# Patient Record
Sex: FEMALE | Race: WHITE | NOT HISPANIC OR LATINO
[De-identification: names, ages, dates, MRNs, and addresses within clinical notes are randomized per-mention and may not be internally consistent; named-entity substitution may affect disease eponyms.]

---

## 2017-02-16 ENCOUNTER — APPOINTMENT (OUTPATIENT)
Dept: ORTHOPEDIC SURGERY | Facility: CLINIC | Age: 62
End: 2017-02-16

## 2017-02-16 VITALS — BODY MASS INDEX: 33.29 KG/M2 | WEIGHT: 195 LBS | HEIGHT: 64 IN

## 2017-02-16 VITALS — HEART RATE: 83 BPM | SYSTOLIC BLOOD PRESSURE: 168 MMHG | DIASTOLIC BLOOD PRESSURE: 92 MMHG

## 2017-03-08 ENCOUNTER — TRANSCRIPTION ENCOUNTER (OUTPATIENT)
Age: 62
End: 2017-03-08

## 2017-03-27 ENCOUNTER — APPOINTMENT (OUTPATIENT)
Dept: INTERNAL MEDICINE | Facility: CLINIC | Age: 62
End: 2017-03-27

## 2017-03-27 VITALS
SYSTOLIC BLOOD PRESSURE: 130 MMHG | BODY MASS INDEX: 33.46 KG/M2 | HEIGHT: 64 IN | OXYGEN SATURATION: 98 % | TEMPERATURE: 97.8 F | DIASTOLIC BLOOD PRESSURE: 84 MMHG | HEART RATE: 88 BPM | WEIGHT: 196 LBS

## 2017-03-27 VITALS — SYSTOLIC BLOOD PRESSURE: 150 MMHG | DIASTOLIC BLOOD PRESSURE: 100 MMHG

## 2017-03-27 VITALS — SYSTOLIC BLOOD PRESSURE: 150 MMHG | DIASTOLIC BLOOD PRESSURE: 94 MMHG

## 2017-03-27 DIAGNOSIS — Z87.891 PERSONAL HISTORY OF NICOTINE DEPENDENCE: ICD-10-CM

## 2017-03-27 DIAGNOSIS — Z78.9 OTHER SPECIFIED HEALTH STATUS: ICD-10-CM

## 2017-03-27 RX ORDER — TOLTERODINE TARTRATE 4 MG/1
CAPSULE, EXTENDED RELEASE ORAL
Refills: 0 | Status: DISCONTINUED | COMMUNITY
End: 2017-03-27

## 2017-03-27 RX ORDER — MELOXICAM 15 MG/1
15 TABLET ORAL
Qty: 30 | Refills: 0 | Status: DISCONTINUED | COMMUNITY
Start: 2017-02-16 | End: 2017-03-27

## 2017-03-27 RX ORDER — OMEPRAZOLE MAGNESIUM 40 MG/1
CAPSULE, DELAYED RELEASE ORAL
Refills: 0 | Status: DISCONTINUED | COMMUNITY
End: 2017-03-27

## 2017-04-03 ENCOUNTER — RX RENEWAL (OUTPATIENT)
Age: 62
End: 2017-04-03

## 2017-04-17 ENCOUNTER — APPOINTMENT (OUTPATIENT)
Dept: ORTHOPEDIC SURGERY | Facility: CLINIC | Age: 62
End: 2017-04-17

## 2017-04-19 ENCOUNTER — FORM ENCOUNTER (OUTPATIENT)
Age: 62
End: 2017-04-19

## 2017-04-20 ENCOUNTER — APPOINTMENT (OUTPATIENT)
Dept: MRI IMAGING | Facility: CLINIC | Age: 62
End: 2017-04-20

## 2017-04-20 ENCOUNTER — OUTPATIENT (OUTPATIENT)
Dept: OUTPATIENT SERVICES | Facility: HOSPITAL | Age: 62
LOS: 1 days | End: 2017-04-20
Payer: COMMERCIAL

## 2017-04-20 DIAGNOSIS — M75.42 IMPINGEMENT SYNDROME OF LEFT SHOULDER: ICD-10-CM

## 2017-04-20 DIAGNOSIS — N85.8 OTHER SPECIFIED NONINFLAMMATORY DISORDERS OF UTERUS: Chronic | ICD-10-CM

## 2017-04-20 PROCEDURE — 73221 MRI JOINT UPR EXTREM W/O DYE: CPT

## 2017-04-27 ENCOUNTER — FORM ENCOUNTER (OUTPATIENT)
Age: 62
End: 2017-04-27

## 2017-04-28 ENCOUNTER — APPOINTMENT (OUTPATIENT)
Dept: INTERNAL MEDICINE | Facility: CLINIC | Age: 62
End: 2017-04-28

## 2017-04-28 ENCOUNTER — APPOINTMENT (OUTPATIENT)
Dept: ULTRASOUND IMAGING | Facility: IMAGING CENTER | Age: 62
End: 2017-04-28

## 2017-04-28 ENCOUNTER — OUTPATIENT (OUTPATIENT)
Dept: OUTPATIENT SERVICES | Facility: HOSPITAL | Age: 62
LOS: 1 days | End: 2017-04-28
Payer: COMMERCIAL

## 2017-04-28 VITALS
SYSTOLIC BLOOD PRESSURE: 130 MMHG | BODY MASS INDEX: 33.12 KG/M2 | DIASTOLIC BLOOD PRESSURE: 80 MMHG | WEIGHT: 194 LBS | TEMPERATURE: 98.3 F | OXYGEN SATURATION: 99 % | HEIGHT: 64 IN | HEART RATE: 81 BPM

## 2017-04-28 DIAGNOSIS — N85.8 OTHER SPECIFIED NONINFLAMMATORY DISORDERS OF UTERUS: Chronic | ICD-10-CM

## 2017-04-28 DIAGNOSIS — Z00.8 ENCOUNTER FOR OTHER GENERAL EXAMINATION: ICD-10-CM

## 2017-04-28 LAB
BILIRUB UR QL STRIP: NORMAL
GLUCOSE UR-MCNC: NORMAL
HCG UR QL: 0.2 EU/DL
HGB UR QL STRIP.AUTO: NORMAL
KETONES UR-MCNC: NORMAL
LEUKOCYTE ESTERASE UR QL STRIP: NORMAL
NITRITE UR QL STRIP: NORMAL
PH UR STRIP: 5.5
PROT UR STRIP-MCNC: NORMAL
SP GR UR STRIP: 1.01

## 2017-04-28 PROCEDURE — 76700 US EXAM ABDOM COMPLETE: CPT

## 2017-04-29 LAB
ALBUMIN SERPL ELPH-MCNC: 4.1 G/DL
ALP BLD-CCNC: 102 U/L
ALT SERPL-CCNC: 18 U/L
AMYLASE/CREAT SERPL: 34 U/L
ANION GAP SERPL CALC-SCNC: 15 MMOL/L
AST SERPL-CCNC: 23 U/L
BASOPHILS # BLD AUTO: 0.01 K/UL
BASOPHILS NFR BLD AUTO: 0.2 %
BILIRUB SERPL-MCNC: 0.3 MG/DL
BUN SERPL-MCNC: 16 MG/DL
CALCIUM SERPL-MCNC: 9.7 MG/DL
CHLORIDE SERPL-SCNC: 101 MMOL/L
CO2 SERPL-SCNC: 23 MMOL/L
CREAT SERPL-MCNC: 0.66 MG/DL
EOSINOPHIL # BLD AUTO: 0.09 K/UL
EOSINOPHIL NFR BLD AUTO: 1.6 %
GLUCOSE SERPL-MCNC: 80 MG/DL
HCT VFR BLD CALC: 36.7 %
HGB BLD-MCNC: 11.7 G/DL
IMM GRANULOCYTES NFR BLD AUTO: 0.2 %
LPL SERPL-CCNC: 16 U/L
LYMPHOCYTES # BLD AUTO: 1.58 K/UL
LYMPHOCYTES NFR BLD AUTO: 27.5 %
MAN DIFF?: NORMAL
MCHC RBC-ENTMCNC: 29.5 PG
MCHC RBC-ENTMCNC: 31.9 GM/DL
MCV RBC AUTO: 92.7 FL
MONOCYTES # BLD AUTO: 0.41 K/UL
MONOCYTES NFR BLD AUTO: 7.1 %
NEUTROPHILS # BLD AUTO: 3.65 K/UL
NEUTROPHILS NFR BLD AUTO: 63.4 %
PLATELET # BLD AUTO: 282 K/UL
POTASSIUM SERPL-SCNC: 4.4 MMOL/L
PROT SERPL-MCNC: 7.9 G/DL
RBC # BLD: 3.96 M/UL
RBC # FLD: 15.4 %
SODIUM SERPL-SCNC: 139 MMOL/L
WBC # FLD AUTO: 5.75 K/UL

## 2017-04-30 LAB — BACTERIA UR CULT: NORMAL

## 2017-05-01 ENCOUNTER — CLINICAL ADVICE (OUTPATIENT)
Age: 62
End: 2017-05-01

## 2017-05-08 ENCOUNTER — APPOINTMENT (OUTPATIENT)
Dept: ORTHOPEDIC SURGERY | Facility: CLINIC | Age: 62
End: 2017-05-08

## 2017-06-01 ENCOUNTER — APPOINTMENT (OUTPATIENT)
Dept: ORTHOPEDIC SURGERY | Facility: CLINIC | Age: 62
End: 2017-06-01
Payer: OTHER MISCELLANEOUS

## 2017-06-01 ENCOUNTER — EMERGENCY (EMERGENCY)
Facility: HOSPITAL | Age: 62
LOS: 1 days | Discharge: HOME | End: 2017-06-01
Attending: EMERGENCY MEDICINE | Admitting: EMERGENCY MEDICINE
Payer: COMMERCIAL

## 2017-06-01 VITALS
OXYGEN SATURATION: 98 % | HEART RATE: 76 BPM | WEIGHT: 192.9 LBS | SYSTOLIC BLOOD PRESSURE: 127 MMHG | RESPIRATION RATE: 16 BRPM | TEMPERATURE: 98 F | DIASTOLIC BLOOD PRESSURE: 59 MMHG | HEIGHT: 64 IN

## 2017-06-01 VITALS
OXYGEN SATURATION: 100 % | DIASTOLIC BLOOD PRESSURE: 89 MMHG | RESPIRATION RATE: 16 BRPM | SYSTOLIC BLOOD PRESSURE: 151 MMHG | HEART RATE: 75 BPM | TEMPERATURE: 98 F

## 2017-06-01 VITALS
DIASTOLIC BLOOD PRESSURE: 84 MMHG | WEIGHT: 194 LBS | HEART RATE: 84 BPM | HEIGHT: 64 IN | BODY MASS INDEX: 33.12 KG/M2 | SYSTOLIC BLOOD PRESSURE: 133 MMHG

## 2017-06-01 DIAGNOSIS — N85.8 OTHER SPECIFIED NONINFLAMMATORY DISORDERS OF UTERUS: Chronic | ICD-10-CM

## 2017-06-01 PROCEDURE — 99283 EMERGENCY DEPT VISIT LOW MDM: CPT | Mod: 25

## 2017-06-01 PROCEDURE — 29125 APPL SHORT ARM SPLINT STATIC: CPT

## 2017-06-01 PROCEDURE — 73110 X-RAY EXAM OF WRIST: CPT

## 2017-06-01 PROCEDURE — 73110 X-RAY EXAM OF WRIST: CPT | Mod: 26,RT

## 2017-06-01 PROCEDURE — 29125 APPL SHORT ARM SPLINT STATIC: CPT | Mod: LT

## 2017-06-01 PROCEDURE — 99203 OFFICE O/P NEW LOW 30 MIN: CPT

## 2017-06-01 RX ORDER — TRAMADOL HYDROCHLORIDE 50 MG/1
1 TABLET ORAL
Qty: 15 | Refills: 0 | OUTPATIENT
Start: 2017-06-01

## 2017-06-01 RX ORDER — TRAMADOL HYDROCHLORIDE 50 MG/1
50 TABLET ORAL ONCE
Qty: 0 | Refills: 0 | Status: DISCONTINUED | OUTPATIENT
Start: 2017-06-01 | End: 2017-06-01

## 2017-06-01 RX ADMIN — TRAMADOL HYDROCHLORIDE 50 MILLIGRAM(S): 50 TABLET ORAL at 10:52

## 2017-06-01 RX ADMIN — TRAMADOL HYDROCHLORIDE 50 MILLIGRAM(S): 50 TABLET ORAL at 10:49

## 2017-06-01 NOTE — ED PROVIDER NOTE - MEDICAL DECISION MAKING DETAILS
60 yo female fell on [address] co left wrist fx. Xray reveals Colles. Plan- Splint sling, Rx tramadol, FU orthopedist.

## 2017-06-01 NOTE — ED ADULT NURSE NOTE - OBJECTIVE STATEMENT
61 yr. old female s/p fall.  C/o right wrist pain.  Positive deformity, positive pulses.  Positioned for comfort.  Ice applied.

## 2017-06-01 NOTE — ED PROCEDURE NOTE - NS ED PERI VASCULAR NEG
fingers/toes warm to touch/capillary refill time < 2 seconds/no paresthesia/no swelling/no cyanosis of extremity

## 2017-06-01 NOTE — ED PROCEDURE NOTE - CPROC ED POST PROC CARE GUIDE1
Elevate the injured extremity as instructed./Verbal/written post procedure instructions were given to patient/caregiver./Keep the cast/splint/dressing clean and dry./Instructed patient/caregiver to follow-up with primary care physician.

## 2017-06-06 ENCOUNTER — OUTPATIENT (OUTPATIENT)
Dept: OUTPATIENT SERVICES | Facility: HOSPITAL | Age: 62
LOS: 1 days | End: 2017-06-06
Payer: OTHER MISCELLANEOUS

## 2017-06-06 ENCOUNTER — APPOINTMENT (OUTPATIENT)
Dept: ORTHOPEDIC SURGERY | Facility: CLINIC | Age: 62
End: 2017-06-06

## 2017-06-06 VITALS
WEIGHT: 194.01 LBS | SYSTOLIC BLOOD PRESSURE: 130 MMHG | TEMPERATURE: 98 F | DIASTOLIC BLOOD PRESSURE: 90 MMHG | HEART RATE: 67 BPM | RESPIRATION RATE: 16 BRPM | HEIGHT: 64.5 IN

## 2017-06-06 DIAGNOSIS — N85.8 OTHER SPECIFIED NONINFLAMMATORY DISORDERS OF UTERUS: Chronic | ICD-10-CM

## 2017-06-06 DIAGNOSIS — Z90.49 ACQUIRED ABSENCE OF OTHER SPECIFIED PARTS OF DIGESTIVE TRACT: Chronic | ICD-10-CM

## 2017-06-06 DIAGNOSIS — I10 ESSENTIAL (PRIMARY) HYPERTENSION: ICD-10-CM

## 2017-06-06 DIAGNOSIS — S52.531A COLLES' FRACTURE OF RIGHT RADIUS, INITIAL ENCOUNTER FOR CLOSED FRACTURE: ICD-10-CM

## 2017-06-06 PROCEDURE — 93010 ELECTROCARDIOGRAM REPORT: CPT

## 2017-06-06 NOTE — H&P PST ADULT - PRO PAIN LIFE ADAPT
inability to work/inability or reluctance to perform ADLs/decreased appetite/inability to sleep/decreased activity level

## 2017-06-06 NOTE — H&P PST ADULT - PROBLEM SELECTOR PLAN 1
61 yr old female scheduled for open reduction internal fixation right distal radius on 6/7/2017  Pre op instruction given and patient verbalized understanding  Labs in chart  EKG done  Case discussed with Dr. Anton 61 yr old female scheduled for open reduction internal fixation right distal radius on 6/7/2017  Pre op instruction given and patient verbalized understanding  Labs in chart  EKG done

## 2017-06-06 NOTE — H&P PST ADULT - PMH
Gallstones    GERD (gastroesophageal reflux disease)    Hypertension    Irritable bladder    Obesity (BMI 30-39.9)

## 2017-06-06 NOTE — H&P PST ADULT - RS GEN PE MLT RESP DETAILS PC
no wheezes/airway patent/clear to auscultation bilaterally/breath sounds equal/no rhonchi/respirations non-labored/no rales

## 2017-06-06 NOTE — H&P PST ADULT - HISTORY OF PRESENT ILLNESS
61 yr old female with PMH HTN, GERD Overactive bladder presents to PST with h/o fall on 6/1/17,c/o right hand pain xray findings of Colles' fracture , scheduled for open reduction and internal fixation right distal radius on 6/7/2017

## 2017-06-06 NOTE — H&P PST ADULT - NEGATIVE NEUROLOGICAL SYMPTOMS
no weakness/no focal seizures/no generalized seizures/no syncope/no transient paralysis/no paresthesias

## 2017-06-06 NOTE — H&P PST ADULT - FAMILY HISTORY
Father  Still living? No  Family history of COPD (chronic obstructive pulmonary disease), Age at diagnosis: Age Unknown

## 2017-06-07 ENCOUNTER — APPOINTMENT (OUTPATIENT)
Dept: ORTHOPEDIC SURGERY | Facility: AMBULATORY SURGERY CENTER | Age: 62
End: 2017-06-07

## 2017-06-07 ENCOUNTER — OUTPATIENT (OUTPATIENT)
Dept: OUTPATIENT SERVICES | Facility: HOSPITAL | Age: 62
LOS: 1 days | Discharge: HOME | End: 2017-06-07
Payer: OTHER MISCELLANEOUS

## 2017-06-07 VITALS
HEART RATE: 72 BPM | TEMPERATURE: 98 F | OXYGEN SATURATION: 100 % | RESPIRATION RATE: 16 BRPM | DIASTOLIC BLOOD PRESSURE: 66 MMHG | HEIGHT: 64.5 IN | SYSTOLIC BLOOD PRESSURE: 147 MMHG | WEIGHT: 194.01 LBS

## 2017-06-07 VITALS
HEART RATE: 83 BPM | SYSTOLIC BLOOD PRESSURE: 150 MMHG | OXYGEN SATURATION: 96 % | TEMPERATURE: 98 F | DIASTOLIC BLOOD PRESSURE: 84 MMHG | RESPIRATION RATE: 16 BRPM

## 2017-06-07 DIAGNOSIS — Z60.2 PROBLEMS RELATED TO LIVING ALONE: ICD-10-CM

## 2017-06-07 DIAGNOSIS — S52.531A COLLES' FRACTURE OF RIGHT RADIUS, INITIAL ENCOUNTER FOR CLOSED FRACTURE: ICD-10-CM

## 2017-06-07 DIAGNOSIS — Z78.9 OTHER SPECIFIED HEALTH STATUS: ICD-10-CM

## 2017-06-07 DIAGNOSIS — Z82.61 FAMILY HISTORY OF ARTHRITIS: ICD-10-CM

## 2017-06-07 DIAGNOSIS — N85.8 OTHER SPECIFIED NONINFLAMMATORY DISORDERS OF UTERUS: Chronic | ICD-10-CM

## 2017-06-07 DIAGNOSIS — Z90.49 ACQUIRED ABSENCE OF OTHER SPECIFIED PARTS OF DIGESTIVE TRACT: Chronic | ICD-10-CM

## 2017-06-07 PROCEDURE — 25607 OPTX DST RD XARTC FX/EPI SEP: CPT | Mod: AS

## 2017-06-07 PROCEDURE — 25607 OPTX DST RD XARTC FX/EPI SEP: CPT | Mod: RT

## 2017-06-07 SDOH — SOCIAL STABILITY - SOCIAL INSECURITY: PROBLEMS RELATED TO LIVING ALONE: Z60.2

## 2017-06-07 NOTE — ASU DISCHARGE PLAN (ADULT/PEDIATRIC). - SPECIAL INSTRUCTIONS
Elevate hand to help with swelling   Ice as needed for 20 minutes at a time   Do not take Tylenol with percocet as there is Tylenol with percocet   Increase fluids and fiber in diet   Take stool softener as needed to help with possible constipation from pain meds

## 2017-06-07 NOTE — ASU DISCHARGE PLAN (ADULT/PEDIATRIC). - NOTIFY
Pain not relieved by Medications/Bleeding that does not stop/Unable to Urinate/Fever greater than 101/Swelling that continues/Persistent Nausea and Vomiting/Numbness, color, or temperature change to extremity/Inability to Tolerate Liquids or Foods

## 2017-06-09 ENCOUNTER — TRANSCRIPTION ENCOUNTER (OUTPATIENT)
Age: 62
End: 2017-06-09

## 2017-06-12 DIAGNOSIS — S52.531A COLLES' FRACTURE OF RIGHT RADIUS, INITIAL ENCOUNTER FOR CLOSED FRACTURE: ICD-10-CM

## 2017-06-15 ENCOUNTER — APPOINTMENT (OUTPATIENT)
Dept: ORTHOPEDIC SURGERY | Facility: CLINIC | Age: 62
End: 2017-06-15

## 2017-06-25 ENCOUNTER — RX RENEWAL (OUTPATIENT)
Age: 62
End: 2017-06-25

## 2017-07-11 ENCOUNTER — APPOINTMENT (OUTPATIENT)
Dept: ORTHOPEDIC SURGERY | Facility: CLINIC | Age: 62
End: 2017-07-11

## 2017-07-19 ENCOUNTER — APPOINTMENT (OUTPATIENT)
Dept: INTERNAL MEDICINE | Facility: CLINIC | Age: 62
End: 2017-07-19

## 2017-07-19 VITALS
DIASTOLIC BLOOD PRESSURE: 90 MMHG | HEIGHT: 65 IN | OXYGEN SATURATION: 96 % | TEMPERATURE: 98.5 F | WEIGHT: 193 LBS | HEART RATE: 89 BPM | BODY MASS INDEX: 32.15 KG/M2 | SYSTOLIC BLOOD PRESSURE: 140 MMHG

## 2017-07-19 VITALS — DIASTOLIC BLOOD PRESSURE: 86 MMHG | SYSTOLIC BLOOD PRESSURE: 132 MMHG

## 2017-07-19 RX ORDER — TRAMADOL HYDROCHLORIDE 50 MG/1
50 TABLET, COATED ORAL
Qty: 15 | Refills: 0 | Status: DISCONTINUED | COMMUNITY
Start: 2017-06-01 | End: 2017-07-19

## 2017-07-19 RX ORDER — HYDROCODONE BITARTRATE AND ACETAMINOPHEN 7.5; 325 MG/1; MG/1
7.5-325 TABLET ORAL
Qty: 20 | Refills: 0 | Status: DISCONTINUED | COMMUNITY
Start: 2017-03-21 | End: 2017-07-19

## 2017-07-19 RX ORDER — TOLTERODINE TARTRATE 4 MG/1
4 CAPSULE, EXTENDED RELEASE ORAL
Refills: 0 | Status: DISCONTINUED | COMMUNITY
End: 2017-07-19

## 2017-07-19 RX ORDER — OMEPRAZOLE MAGNESIUM 20 MG/1
TABLET, DELAYED RELEASE ORAL
Refills: 0 | Status: DISCONTINUED | COMMUNITY
End: 2017-07-19

## 2017-07-19 RX ORDER — AMOXICILLIN AND CLAVULANATE POTASSIUM 875; 125 MG/1; MG/1
875-125 TABLET, COATED ORAL
Qty: 20 | Refills: 0 | Status: DISCONTINUED | COMMUNITY
Start: 2017-03-21 | End: 2017-07-19

## 2017-07-19 RX ORDER — RANITIDINE HYDROCHLORIDE 300 MG/1
300 CAPSULE ORAL
Qty: 180 | Refills: 0 | Status: DISCONTINUED | COMMUNITY
Start: 2017-05-30 | End: 2017-07-19

## 2017-07-19 RX ORDER — KETOROLAC TROMETHAMINE 10 MG/1
10 TABLET, FILM COATED ORAL
Qty: 20 | Refills: 0 | Status: DISCONTINUED | COMMUNITY
Start: 2017-03-21 | End: 2017-07-19

## 2017-07-19 RX ORDER — OXYCODONE AND ACETAMINOPHEN 5; 325 MG/1; MG/1
5-325 TABLET ORAL
Qty: 20 | Refills: 0 | Status: DISCONTINUED | COMMUNITY
Start: 2017-03-08 | End: 2017-07-19

## 2017-07-19 RX ORDER — OXYCODONE AND ACETAMINOPHEN 5; 325 MG/1; MG/1
5-325 TABLET ORAL
Qty: 40 | Refills: 0 | Status: DISCONTINUED | COMMUNITY
Start: 2017-06-07 | End: 2017-07-19

## 2017-07-19 RX ORDER — CHLORHEXIDINE GLUCONATE, 0.12% ORAL RINSE 1.2 MG/ML
0.12 SOLUTION DENTAL
Qty: 473 | Refills: 0 | Status: DISCONTINUED | COMMUNITY
Start: 2017-03-21 | End: 2017-07-19

## 2017-07-27 ENCOUNTER — FORM ENCOUNTER (OUTPATIENT)
Age: 62
End: 2017-07-27

## 2017-07-28 ENCOUNTER — APPOINTMENT (OUTPATIENT)
Dept: RADIOLOGY | Facility: CLINIC | Age: 62
End: 2017-07-28
Payer: COMMERCIAL

## 2017-07-28 ENCOUNTER — APPOINTMENT (OUTPATIENT)
Dept: MAMMOGRAPHY | Facility: CLINIC | Age: 62
End: 2017-07-28
Payer: COMMERCIAL

## 2017-07-28 ENCOUNTER — OUTPATIENT (OUTPATIENT)
Dept: OUTPATIENT SERVICES | Facility: HOSPITAL | Age: 62
LOS: 1 days | End: 2017-07-28
Payer: COMMERCIAL

## 2017-07-28 DIAGNOSIS — Z90.49 ACQUIRED ABSENCE OF OTHER SPECIFIED PARTS OF DIGESTIVE TRACT: Chronic | ICD-10-CM

## 2017-07-28 DIAGNOSIS — N85.8 OTHER SPECIFIED NONINFLAMMATORY DISORDERS OF UTERUS: Chronic | ICD-10-CM

## 2017-07-28 DIAGNOSIS — Z00.8 ENCOUNTER FOR OTHER GENERAL EXAMINATION: ICD-10-CM

## 2017-07-28 PROCEDURE — 77063 BREAST TOMOSYNTHESIS BI: CPT | Mod: 26

## 2017-07-28 PROCEDURE — G0202: CPT | Mod: 26

## 2017-07-28 PROCEDURE — 77080 DXA BONE DENSITY AXIAL: CPT | Mod: 26

## 2017-07-28 PROCEDURE — 77067 SCR MAMMO BI INCL CAD: CPT

## 2017-07-28 PROCEDURE — 77063 BREAST TOMOSYNTHESIS BI: CPT

## 2017-07-28 PROCEDURE — 77080 DXA BONE DENSITY AXIAL: CPT

## 2017-08-01 LAB
25(OH)D3 SERPL-MCNC: 39.1 NG/ML
ALBUMIN SERPL ELPH-MCNC: 4.5 G/DL
ALP BLD-CCNC: 88 U/L
ALT SERPL-CCNC: 24 U/L
ANION GAP SERPL CALC-SCNC: 17 MMOL/L
APPEARANCE: CLEAR
AST SERPL-CCNC: 23 U/L
BACTERIA: NEGATIVE
BASOPHILS # BLD AUTO: 0.02 K/UL
BASOPHILS NFR BLD AUTO: 0.3 %
BILIRUB SERPL-MCNC: 0.3 MG/DL
BILIRUBIN URINE: NEGATIVE
BLOOD URINE: ABNORMAL
BUN SERPL-MCNC: 20 MG/DL
CALCIUM SERPL-MCNC: 9.9 MG/DL
CHLORIDE SERPL-SCNC: 104 MMOL/L
CHOLEST SERPL-MCNC: 201 MG/DL
CHOLEST/HDLC SERPL: 3 RATIO
CO2 SERPL-SCNC: 24 MMOL/L
COLOR: YELLOW
CREAT SERPL-MCNC: 0.85 MG/DL
EOSINOPHIL # BLD AUTO: 0.08 K/UL
EOSINOPHIL NFR BLD AUTO: 1 %
GLUCOSE QUALITATIVE U: NORMAL MG/DL
GLUCOSE SERPL-MCNC: 86 MG/DL
HBA1C MFR BLD HPLC: 5.3 %
HCT VFR BLD CALC: 38.6 %
HDLC SERPL-MCNC: 67 MG/DL
HGB BLD-MCNC: 12.3 G/DL
HYALINE CASTS: 5 /LPF
IMM GRANULOCYTES NFR BLD AUTO: 0.3 %
KETONES URINE: NEGATIVE
LDLC SERPL CALC-MCNC: 106 MG/DL
LEUKOCYTE ESTERASE URINE: NEGATIVE
LYMPHOCYTES # BLD AUTO: 1.29 K/UL
LYMPHOCYTES NFR BLD AUTO: 16.2 %
MAN DIFF?: NORMAL
MCHC RBC-ENTMCNC: 29.1 PG
MCHC RBC-ENTMCNC: 31.9 GM/DL
MCV RBC AUTO: 91.5 FL
MICROSCOPIC-UA: NORMAL
MONOCYTES # BLD AUTO: 0.29 K/UL
MONOCYTES NFR BLD AUTO: 3.6 %
NEUTROPHILS # BLD AUTO: 6.27 K/UL
NEUTROPHILS NFR BLD AUTO: 78.6 %
NITRITE URINE: NEGATIVE
PH URINE: 6.5
PLATELET # BLD AUTO: 255 K/UL
POTASSIUM SERPL-SCNC: 4.2 MMOL/L
PROT SERPL-MCNC: 7.8 G/DL
PROTEIN URINE: NEGATIVE MG/DL
RBC # BLD: 4.22 M/UL
RBC # FLD: 14.7 %
RED BLOOD CELLS URINE: 5 /HPF
SODIUM SERPL-SCNC: 145 MMOL/L
SPECIFIC GRAVITY URINE: 1.02
SQUAMOUS EPITHELIAL CELLS: 3 /HPF
TRIGL SERPL-MCNC: 139 MG/DL
TSH SERPL-ACNC: 2.72 UIU/ML
UROBILINOGEN URINE: NORMAL MG/DL
WBC # FLD AUTO: 7.97 K/UL
WHITE BLOOD CELLS URINE: 1 /HPF

## 2017-08-04 DIAGNOSIS — M85.89 OTHER SPECIFIED DISORDERS OF BONE DENSITY AND STRUCTURE, MULTIPLE SITES: ICD-10-CM

## 2017-08-04 DIAGNOSIS — Z12.31 ENCOUNTER FOR SCREENING MAMMOGRAM FOR MALIGNANT NEOPLASM OF BREAST: ICD-10-CM

## 2017-08-08 ENCOUNTER — APPOINTMENT (OUTPATIENT)
Dept: ORTHOPEDIC SURGERY | Facility: CLINIC | Age: 62
End: 2017-08-08
Payer: COMMERCIAL

## 2017-08-08 PROCEDURE — 99024 POSTOP FOLLOW-UP VISIT: CPT

## 2017-11-02 ENCOUNTER — RESULT REVIEW (OUTPATIENT)
Age: 62
End: 2017-11-02

## 2017-12-10 ENCOUNTER — RX RENEWAL (OUTPATIENT)
Age: 62
End: 2017-12-10

## 2018-01-08 ENCOUNTER — NON-APPOINTMENT (OUTPATIENT)
Age: 63
End: 2018-01-08

## 2018-01-08 ENCOUNTER — RX RENEWAL (OUTPATIENT)
Age: 63
End: 2018-01-08

## 2018-01-08 ENCOUNTER — MEDICATION RENEWAL (OUTPATIENT)
Age: 63
End: 2018-01-08

## 2018-01-08 ENCOUNTER — APPOINTMENT (OUTPATIENT)
Dept: INTERNAL MEDICINE | Facility: CLINIC | Age: 63
End: 2018-01-08
Payer: COMMERCIAL

## 2018-01-08 VITALS — DIASTOLIC BLOOD PRESSURE: 80 MMHG | SYSTOLIC BLOOD PRESSURE: 130 MMHG | HEART RATE: 68 BPM

## 2018-01-08 VITALS
HEIGHT: 64.5 IN | HEART RATE: 82 BPM | SYSTOLIC BLOOD PRESSURE: 140 MMHG | TEMPERATURE: 97.5 F | WEIGHT: 196 LBS | OXYGEN SATURATION: 99 % | DIASTOLIC BLOOD PRESSURE: 80 MMHG | BODY MASS INDEX: 33.05 KG/M2

## 2018-01-08 PROCEDURE — 93000 ELECTROCARDIOGRAM COMPLETE: CPT | Mod: 59

## 2018-01-08 PROCEDURE — 99214 OFFICE O/P EST MOD 30 MIN: CPT | Mod: 25

## 2018-01-08 PROCEDURE — 36415 COLL VENOUS BLD VENIPUNCTURE: CPT

## 2018-01-08 PROCEDURE — 93040 RHYTHM ECG WITH REPORT: CPT | Mod: 59

## 2018-01-08 PROCEDURE — 93225 XTRNL ECG REC<48 HRS REC: CPT

## 2018-01-10 ENCOUNTER — APPOINTMENT (OUTPATIENT)
Dept: INTERNAL MEDICINE | Facility: CLINIC | Age: 63
End: 2018-01-10
Payer: COMMERCIAL

## 2018-01-10 PROCEDURE — 93227 XTRNL ECG REC<48 HR R&I: CPT

## 2018-01-11 LAB
ALBUMIN SERPL ELPH-MCNC: 4.4 G/DL
ALP BLD-CCNC: 104 U/L
ALT SERPL-CCNC: 23 U/L
ANION GAP SERPL CALC-SCNC: 15 MMOL/L
APPEARANCE: CLEAR
AST SERPL-CCNC: 22 U/L
BACTERIA: NEGATIVE
BASOPHILS # BLD AUTO: 0.02 K/UL
BASOPHILS NFR BLD AUTO: 0.3 %
BILIRUB SERPL-MCNC: 0.3 MG/DL
BILIRUBIN URINE: NEGATIVE
BLOOD URINE: NEGATIVE
BUN SERPL-MCNC: 16 MG/DL
CALCIUM SERPL-MCNC: 9.9 MG/DL
CHLORIDE SERPL-SCNC: 101 MMOL/L
CO2 SERPL-SCNC: 25 MMOL/L
COLOR: YELLOW
CORE LAB FLUID CYTOLOGY: NORMAL
CREAT SERPL-MCNC: 0.86 MG/DL
EOSINOPHIL # BLD AUTO: 0.11 K/UL
EOSINOPHIL NFR BLD AUTO: 1.5 %
GLUCOSE QUALITATIVE U: NEGATIVE MG/DL
GLUCOSE SERPL-MCNC: 96 MG/DL
HCT VFR BLD CALC: 39 %
HGB BLD-MCNC: 12.3 G/DL
HYALINE CASTS: 0 /LPF
IMM GRANULOCYTES NFR BLD AUTO: 0.1 %
KETONES URINE: NEGATIVE
LEUKOCYTE ESTERASE URINE: NEGATIVE
LYMPHOCYTES # BLD AUTO: 1.6 K/UL
LYMPHOCYTES NFR BLD AUTO: 22.4 %
MAN DIFF?: NORMAL
MCHC RBC-ENTMCNC: 29.4 PG
MCHC RBC-ENTMCNC: 31.5 GM/DL
MCV RBC AUTO: 93.1 FL
MICROSCOPIC-UA: NORMAL
MONOCYTES # BLD AUTO: 0.35 K/UL
MONOCYTES NFR BLD AUTO: 4.9 %
NEUTROPHILS # BLD AUTO: 5.06 K/UL
NEUTROPHILS NFR BLD AUTO: 70.8 %
NITRITE URINE: NEGATIVE
PH URINE: 6
PLATELET # BLD AUTO: 287 K/UL
POTASSIUM SERPL-SCNC: 4.4 MMOL/L
PROT SERPL-MCNC: 7.9 G/DL
PROTEIN URINE: NEGATIVE MG/DL
RBC # BLD: 4.19 M/UL
RBC # FLD: 15.2 %
RED BLOOD CELLS URINE: 0 /HPF
SODIUM SERPL-SCNC: 141 MMOL/L
SPECIFIC GRAVITY URINE: 1.02
SQUAMOUS EPITHELIAL CELLS: 3 /HPF
TSH SERPL-ACNC: 3.48 UIU/ML
UROBILINOGEN URINE: NEGATIVE MG/DL
WBC # FLD AUTO: 7.15 K/UL
WHITE BLOOD CELLS URINE: 1 /HPF

## 2018-01-22 ENCOUNTER — APPOINTMENT (OUTPATIENT)
Dept: CARDIOLOGY | Facility: CLINIC | Age: 63
End: 2018-01-22
Payer: COMMERCIAL

## 2018-01-22 PROCEDURE — 93306 TTE W/DOPPLER COMPLETE: CPT

## 2018-02-27 ENCOUNTER — APPOINTMENT (OUTPATIENT)
Dept: ORTHOPEDIC SURGERY | Facility: CLINIC | Age: 63
End: 2018-02-27
Payer: OTHER MISCELLANEOUS

## 2018-02-27 PROCEDURE — 99214 OFFICE O/P EST MOD 30 MIN: CPT

## 2018-03-22 ENCOUNTER — RX RENEWAL (OUTPATIENT)
Age: 63
End: 2018-03-22

## 2018-04-18 ENCOUNTER — APPOINTMENT (OUTPATIENT)
Dept: INTERNAL MEDICINE | Facility: CLINIC | Age: 63
End: 2018-04-18
Payer: COMMERCIAL

## 2018-04-18 VITALS
TEMPERATURE: 98 F | BODY MASS INDEX: 33.39 KG/M2 | WEIGHT: 198 LBS | OXYGEN SATURATION: 99 % | SYSTOLIC BLOOD PRESSURE: 126 MMHG | HEART RATE: 78 BPM | DIASTOLIC BLOOD PRESSURE: 72 MMHG | HEIGHT: 64.5 IN

## 2018-04-18 VITALS — SYSTOLIC BLOOD PRESSURE: 124 MMHG | DIASTOLIC BLOOD PRESSURE: 80 MMHG

## 2018-04-18 DIAGNOSIS — S52.531A COLLES' FRACTURE OF RIGHT RADIUS, INITIAL ENCOUNTER FOR CLOSED FRACTURE: ICD-10-CM

## 2018-04-18 PROCEDURE — 99214 OFFICE O/P EST MOD 30 MIN: CPT

## 2018-04-23 PROBLEM — S52.531A CLOSED COLLES' FRACTURE OF RIGHT RADIUS: Status: RESOLVED | Noted: 2017-06-01 | Resolved: 2018-04-23

## 2018-05-09 ENCOUNTER — FORM ENCOUNTER (OUTPATIENT)
Age: 63
End: 2018-05-09

## 2018-05-09 ENCOUNTER — APPOINTMENT (OUTPATIENT)
Dept: INTERNAL MEDICINE | Facility: CLINIC | Age: 63
End: 2018-05-09

## 2018-05-09 ENCOUNTER — APPOINTMENT (OUTPATIENT)
Dept: ORTHOPEDIC SURGERY | Facility: CLINIC | Age: 63
End: 2018-05-09
Payer: COMMERCIAL

## 2018-05-09 VITALS
HEART RATE: 71 BPM | BODY MASS INDEX: 33.29 KG/M2 | DIASTOLIC BLOOD PRESSURE: 98 MMHG | SYSTOLIC BLOOD PRESSURE: 162 MMHG | WEIGHT: 195 LBS | HEIGHT: 64 IN

## 2018-05-09 PROCEDURE — 73522 X-RAY EXAM HIPS BI 3-4 VIEWS: CPT

## 2018-05-09 PROCEDURE — 99214 OFFICE O/P EST MOD 30 MIN: CPT

## 2018-05-09 PROCEDURE — 73564 X-RAY EXAM KNEE 4 OR MORE: CPT | Mod: RT

## 2018-05-10 ENCOUNTER — OUTPATIENT (OUTPATIENT)
Dept: OUTPATIENT SERVICES | Facility: HOSPITAL | Age: 63
LOS: 1 days | End: 2018-05-10
Payer: COMMERCIAL

## 2018-05-10 ENCOUNTER — TRANSCRIPTION ENCOUNTER (OUTPATIENT)
Age: 63
End: 2018-05-10

## 2018-05-10 ENCOUNTER — APPOINTMENT (OUTPATIENT)
Dept: MRI IMAGING | Facility: CLINIC | Age: 63
End: 2018-05-10
Payer: COMMERCIAL

## 2018-05-10 ENCOUNTER — APPOINTMENT (OUTPATIENT)
Dept: RADIOLOGY | Facility: CLINIC | Age: 63
End: 2018-05-10
Payer: COMMERCIAL

## 2018-05-10 DIAGNOSIS — N85.8 OTHER SPECIFIED NONINFLAMMATORY DISORDERS OF UTERUS: Chronic | ICD-10-CM

## 2018-05-10 DIAGNOSIS — Z00.8 ENCOUNTER FOR OTHER GENERAL EXAMINATION: ICD-10-CM

## 2018-05-10 DIAGNOSIS — Z90.49 ACQUIRED ABSENCE OF OTHER SPECIFIED PARTS OF DIGESTIVE TRACT: Chronic | ICD-10-CM

## 2018-05-10 PROCEDURE — 73721 MRI JNT OF LWR EXTRE W/O DYE: CPT | Mod: 26,RT

## 2018-05-10 PROCEDURE — 73721 MRI JNT OF LWR EXTRE W/O DYE: CPT

## 2018-05-16 ENCOUNTER — APPOINTMENT (OUTPATIENT)
Dept: ORTHOPEDIC SURGERY | Facility: CLINIC | Age: 63
End: 2018-05-16
Payer: COMMERCIAL

## 2018-05-16 PROCEDURE — 99213 OFFICE O/P EST LOW 20 MIN: CPT

## 2018-06-05 ENCOUNTER — APPOINTMENT (OUTPATIENT)
Dept: ENDOCRINOLOGY | Facility: CLINIC | Age: 63
End: 2018-06-05
Payer: COMMERCIAL

## 2018-06-05 VITALS
WEIGHT: 197 LBS | SYSTOLIC BLOOD PRESSURE: 134 MMHG | OXYGEN SATURATION: 98 % | DIASTOLIC BLOOD PRESSURE: 86 MMHG | HEIGHT: 64 IN | HEART RATE: 74 BPM | BODY MASS INDEX: 33.63 KG/M2

## 2018-06-05 PROCEDURE — 99244 OFF/OP CNSLTJ NEW/EST MOD 40: CPT

## 2018-06-06 LAB
25(OH)D3 SERPL-MCNC: 38.8 NG/ML
ALBUMIN SERPL ELPH-MCNC: 4.8 G/DL
ALP BLD-CCNC: 99 U/L
ALT SERPL-CCNC: 21 U/L
ANION GAP SERPL CALC-SCNC: 16 MMOL/L
AST SERPL-CCNC: 24 U/L
BILIRUB SERPL-MCNC: 0.3 MG/DL
BUN SERPL-MCNC: 15 MG/DL
CALCIUM SERPL-MCNC: 10.2 MG/DL
CALCIUM SERPL-MCNC: 10.2 MG/DL
CHLORIDE SERPL-SCNC: 101 MMOL/L
CO2 SERPL-SCNC: 26 MMOL/L
CREAT SERPL-MCNC: 0.76 MG/DL
GLUCOSE SERPL-MCNC: 103 MG/DL
MAGNESIUM SERPL-MCNC: 1.9 MG/DL
PARATHYROID HORMONE INTACT: 24 PG/ML
POTASSIUM SERPL-SCNC: 4.7 MMOL/L
PROT SERPL-MCNC: 7.7 G/DL
SODIUM SERPL-SCNC: 143 MMOL/L
TSH SERPL-ACNC: 2.61 UIU/ML

## 2018-06-11 ENCOUNTER — EMERGENCY (EMERGENCY)
Facility: HOSPITAL | Age: 63
LOS: 1 days | Discharge: HOME | End: 2018-06-11
Attending: EMERGENCY MEDICINE
Payer: COMMERCIAL

## 2018-06-11 VITALS
WEIGHT: 195.11 LBS | RESPIRATION RATE: 16 BRPM | HEIGHT: 64 IN | DIASTOLIC BLOOD PRESSURE: 78 MMHG | HEART RATE: 83 BPM | SYSTOLIC BLOOD PRESSURE: 171 MMHG | TEMPERATURE: 98 F | OXYGEN SATURATION: 99 %

## 2018-06-11 VITALS
HEART RATE: 88 BPM | RESPIRATION RATE: 15 BRPM | OXYGEN SATURATION: 100 % | SYSTOLIC BLOOD PRESSURE: 163 MMHG | DIASTOLIC BLOOD PRESSURE: 99 MMHG | TEMPERATURE: 98 F

## 2018-06-11 DIAGNOSIS — N85.8 OTHER SPECIFIED NONINFLAMMATORY DISORDERS OF UTERUS: Chronic | ICD-10-CM

## 2018-06-11 DIAGNOSIS — Z90.49 ACQUIRED ABSENCE OF OTHER SPECIFIED PARTS OF DIGESTIVE TRACT: Chronic | ICD-10-CM

## 2018-06-11 PROCEDURE — 99283 EMERGENCY DEPT VISIT LOW MDM: CPT | Mod: 25

## 2018-06-11 PROCEDURE — 73110 X-RAY EXAM OF WRIST: CPT

## 2018-06-11 PROCEDURE — 29125 APPL SHORT ARM SPLINT STATIC: CPT

## 2018-06-11 PROCEDURE — 73110 X-RAY EXAM OF WRIST: CPT | Mod: 26,LT

## 2018-06-11 PROCEDURE — 29125 APPL SHORT ARM SPLINT STATIC: CPT | Mod: LT

## 2018-06-11 RX ORDER — IBUPROFEN 200 MG
600 TABLET ORAL ONCE
Qty: 0 | Refills: 0 | Status: COMPLETED | OUTPATIENT
Start: 2018-06-11 | End: 2018-06-11

## 2018-06-11 RX ADMIN — Medication 600 MILLIGRAM(S): at 19:26

## 2018-06-11 NOTE — ED ADULT NURSE REASSESSMENT NOTE - NS ED NURSE REASSESS COMMENT FT1
left wrist fx splinted by Dr Garsia.  pt offered a sling.  d/c'd with sling in hand.  left unit without incident.

## 2018-06-11 NOTE — ED PROVIDER NOTE - MUSCULOSKELETAL, MLM
Spine appears normal, range of motion is not limited,pt has deformity to posterior left radial wrist with pain on suppination pronation no pain mid forearm or elbow, full rom shoulder and elbow neuro vasc intact

## 2018-06-11 NOTE — ED PROVIDER NOTE - OBJECTIVE STATEMENT
pt is a r hand dom FEMALE who has hx of htn s/p gb is sp r wrist orif at [facility] by Berna santos, was at work today when she tripped and fell on the carpet falling onto outstretched left arm. she has pain to left wrist with deformity so she came to er for eval  no fever no chills no other co  pmd dr Cori mcghee not a smoker social

## 2018-06-11 NOTE — ED PROCEDURE NOTE - CPROC ED POST PROC CARE GUIDE1
Keep the cast/splint/dressing clean and dry./Instructed patient/caregiver to follow-up with primary care physician./Elevate the injured extremity as instructed./Instructed patient/caregiver regarding signs and symptoms of infection.

## 2018-06-12 ENCOUNTER — APPOINTMENT (OUTPATIENT)
Dept: ORTHOPEDIC SURGERY | Facility: CLINIC | Age: 63
End: 2018-06-12
Payer: OTHER MISCELLANEOUS

## 2018-06-12 VITALS
SYSTOLIC BLOOD PRESSURE: 131 MMHG | WEIGHT: 195 LBS | DIASTOLIC BLOOD PRESSURE: 85 MMHG | HEIGHT: 64 IN | HEART RATE: 81 BPM | BODY MASS INDEX: 33.29 KG/M2

## 2018-06-12 PROCEDURE — 99214 OFFICE O/P EST MOD 30 MIN: CPT | Mod: 25

## 2018-06-12 PROCEDURE — 29075 APPL CST ELBW FNGR SHORT ARM: CPT | Mod: LT

## 2018-06-12 RX ORDER — OMEPRAZOLE 10 MG/1
1 CAPSULE, DELAYED RELEASE ORAL
Qty: 0 | Refills: 0 | COMMUNITY

## 2018-06-12 RX ORDER — AMLODIPINE BESYLATE AND OLMESARTRAN MEDOXOMIL 10; 40 MG/1; MG/1
1 TABLET, FILM COATED ORAL
Qty: 0 | Refills: 0 | COMMUNITY

## 2018-06-12 RX ORDER — TOLTERODINE TARTRATE 1 MG/1
1 TABLET, FILM COATED ORAL
Qty: 0 | Refills: 0 | COMMUNITY

## 2018-06-21 ENCOUNTER — APPOINTMENT (OUTPATIENT)
Dept: ORTHOPEDIC SURGERY | Facility: CLINIC | Age: 63
End: 2018-06-21
Payer: OTHER MISCELLANEOUS

## 2018-06-21 ENCOUNTER — APPOINTMENT (OUTPATIENT)
Dept: ORTHOPEDIC SURGERY | Facility: CLINIC | Age: 63
End: 2018-06-21

## 2018-06-21 PROCEDURE — 73110 X-RAY EXAM OF WRIST: CPT | Mod: LT

## 2018-06-21 PROCEDURE — 99214 OFFICE O/P EST MOD 30 MIN: CPT

## 2018-06-25 ENCOUNTER — RX RENEWAL (OUTPATIENT)
Age: 63
End: 2018-06-25

## 2018-06-28 ENCOUNTER — RX RENEWAL (OUTPATIENT)
Age: 63
End: 2018-06-28

## 2018-07-17 ENCOUNTER — APPOINTMENT (OUTPATIENT)
Dept: ORTHOPEDIC SURGERY | Facility: CLINIC | Age: 63
End: 2018-07-17
Payer: COMMERCIAL

## 2018-07-17 VITALS
HEIGHT: 64 IN | BODY MASS INDEX: 33.29 KG/M2 | SYSTOLIC BLOOD PRESSURE: 150 MMHG | WEIGHT: 195 LBS | DIASTOLIC BLOOD PRESSURE: 86 MMHG | HEART RATE: 70 BPM

## 2018-07-17 PROBLEM — I10 ESSENTIAL (PRIMARY) HYPERTENSION: Chronic | Status: ACTIVE | Noted: 2018-06-11

## 2018-07-17 PROBLEM — I10 ESSENTIAL (PRIMARY) HYPERTENSION: Chronic | Status: ACTIVE | Noted: 2017-06-06

## 2018-07-17 PROCEDURE — 73564 X-RAY EXAM KNEE 4 OR MORE: CPT | Mod: LT

## 2018-07-17 PROCEDURE — 99213 OFFICE O/P EST LOW 20 MIN: CPT

## 2018-07-19 ENCOUNTER — APPOINTMENT (OUTPATIENT)
Dept: ORTHOPEDIC SURGERY | Facility: CLINIC | Age: 63
End: 2018-07-19
Payer: OTHER MISCELLANEOUS

## 2018-07-19 PROCEDURE — 99214 OFFICE O/P EST MOD 30 MIN: CPT

## 2018-07-19 PROCEDURE — 73110 X-RAY EXAM OF WRIST: CPT | Mod: LT

## 2018-07-26 ENCOUNTER — MEDICATION RENEWAL (OUTPATIENT)
Age: 63
End: 2018-07-26

## 2018-07-27 ENCOUNTER — APPOINTMENT (OUTPATIENT)
Dept: INTERNAL MEDICINE | Facility: CLINIC | Age: 63
End: 2018-07-27
Payer: COMMERCIAL

## 2018-07-27 VITALS
SYSTOLIC BLOOD PRESSURE: 130 MMHG | HEIGHT: 65 IN | HEART RATE: 71 BPM | BODY MASS INDEX: 32.49 KG/M2 | DIASTOLIC BLOOD PRESSURE: 88 MMHG | OXYGEN SATURATION: 98 % | WEIGHT: 195 LBS

## 2018-07-27 VITALS — SYSTOLIC BLOOD PRESSURE: 126 MMHG | DIASTOLIC BLOOD PRESSURE: 84 MMHG

## 2018-07-27 DIAGNOSIS — Z87.19 PERSONAL HISTORY OF OTHER DISEASES OF THE DIGESTIVE SYSTEM: ICD-10-CM

## 2018-07-27 DIAGNOSIS — M25.551 PAIN IN RIGHT HIP: ICD-10-CM

## 2018-07-27 DIAGNOSIS — Z87.898 PERSONAL HISTORY OF OTHER SPECIFIED CONDITIONS: ICD-10-CM

## 2018-07-27 PROCEDURE — 99214 OFFICE O/P EST MOD 30 MIN: CPT

## 2018-07-27 RX ORDER — TOLTERODINE TARTRATE 4 MG/1
4 CAPSULE, EXTENDED RELEASE ORAL
Refills: 0 | Status: COMPLETED | COMMUNITY

## 2018-07-28 PROBLEM — Z87.19 HISTORY OF EPIGASTRIC PAIN: Status: RESOLVED | Noted: 2017-04-28 | Resolved: 2018-07-28

## 2018-07-28 PROBLEM — Z87.898 HISTORY OF RIGHT FLANK PAIN: Status: RESOLVED | Noted: 2017-04-30 | Resolved: 2018-07-28

## 2018-07-28 PROBLEM — M25.551 RIGHT HIP PAIN: Status: RESOLVED | Noted: 2018-05-09 | Resolved: 2018-07-28

## 2018-07-28 NOTE — PHYSICAL EXAM
[No Acute Distress] : no acute distress [Well Nourished] : well nourished [Well Developed] : well developed [Well-Appearing] : well-appearing [Normal Voice/Communication] : normal voice/communication [Normal Sclera/Conjunctiva] : normal sclera/conjunctiva [PERRL] : pupils equal round and reactive to light [EOMI] : extraocular movements intact [Normal Outer Ear/Nose] : the outer ears and nose were normal in appearance [Normal Oropharynx] : the oropharynx was normal [Normal TMs] : both tympanic membranes were normal [No JVD] : no jugular venous distention [Supple] : supple [No Lymphadenopathy] : no lymphadenopathy [Thyroid Normal, No Nodules] : the thyroid was normal and there were no nodules present [No Respiratory Distress] : no respiratory distress  [Clear to Auscultation] : lungs were clear to auscultation bilaterally [No Accessory Muscle Use] : no accessory muscle use [Normal Percussion] : the chest was normal to percussion [Normal Rate] : normal rate  [Regular Rhythm] : with a regular rhythm [Normal S1, S2] : normal S1 and S2 [No Murmur] : no murmur heard [No Carotid Bruits] : no carotid bruits [Pedal Pulses Present] : the pedal pulses are present [No Edema] : there was no peripheral edema [No Extremity Clubbing/Cyanosis] : no extremity clubbing/cyanosis [Soft] : abdomen soft [Non Tender] : non-tender [Non-distended] : non-distended [No Masses] : no abdominal mass palpated [No HSM] : no HSM [Normal Bowel Sounds] : normal bowel sounds [Normal Supraclavicular Nodes] : no supraclavicular lymphadenopathy [Normal Axillary Nodes] : no axillary lymphadenopathy [Normal Posterior Cervical Nodes] : no posterior cervical lymphadenopathy [Normal Anterior Cervical Nodes] : no anterior cervical lymphadenopathy [Normal Inguinal Nodes] : no inguinal lymphadenopathy [No CVA Tenderness] : no CVA  tenderness [No Spinal Tenderness] : no spinal tenderness [No Joint Swelling] : no joint swelling [Grossly Normal Strength/Tone] : grossly normal strength/tone [No Rash] : no rash [Normal Gait] : normal gait [Coordination Grossly Intact] : coordination grossly intact [No Focal Deficits] : no focal deficits [Speech Grossly Normal] : speech grossly normal [Memory Grossly Normal] : memory grossly normal [Normal Affect] : the affect was normal [Alert and Oriented x3] : oriented to person, place, and time [Normal Mood] : the mood was normal [Normal Insight/Judgement] : insight and judgment were intact [No Skin Lesions] : no skin lesions [de-identified] : Overweight [de-identified] : left arm in soft cast

## 2018-07-28 NOTE — HISTORY OF PRESENT ILLNESS
[FreeTextEntry1] : Hypertension\par Avascular necrosis right hip\par Left wrist fracture\par Frequent falls [de-identified] : Patient has had  an eventful couple of months. She tripped again outside her office just losing her balance and fractured her left wrist. She did not need surgery and is now out of the cast. She also had been experiencing some right hip pain and was diagnosed with avascular necrosis and is now on Atelvia  by endocrinology Dr. Oviedo. Her hip is feeling better. She has had ongoing issues with knee pain and  had a cortisone injection. She is still taking occasional Advil for aches and pains. She is committed to trying to lose weight and limit salt in her diet. She absolutely denies any dizziness or lightheadedness. She has no edema. She is trying to taper off her omeprazole. She takes Advil PM occasionally for sleep

## 2018-07-28 NOTE — ASSESSMENT
[FreeTextEntry1] : Her blood pressure is controlled and she'll continue her current medications.\par She was given a handout on fall precautions.\par She will follow up with endocrinology for her her avascular necrosis as well as orthopedics.\par Reinforced the importance of weight loss for her knee pain hypertension reflux.\par She will try to taper and D/C omeprazole and use H2 blocker instead\par She was advised to use Tylenol or Tylenol PM instead of Advil as the Advil may raise her blood pressure and cause more reflux. She'll be seen in 6 months for complete physical exam

## 2018-08-16 ENCOUNTER — APPOINTMENT (OUTPATIENT)
Dept: ORTHOPEDIC SURGERY | Facility: CLINIC | Age: 63
End: 2018-08-16
Payer: OTHER MISCELLANEOUS

## 2018-08-16 PROCEDURE — 99214 OFFICE O/P EST MOD 30 MIN: CPT

## 2018-08-24 ENCOUNTER — APPOINTMENT (OUTPATIENT)
Dept: ORTHOPEDIC SURGERY | Facility: CLINIC | Age: 63
End: 2018-08-24
Payer: COMMERCIAL

## 2018-08-24 VITALS
BODY MASS INDEX: 31.82 KG/M2 | WEIGHT: 191 LBS | SYSTOLIC BLOOD PRESSURE: 128 MMHG | HEART RATE: 70 BPM | DIASTOLIC BLOOD PRESSURE: 82 MMHG | HEIGHT: 65 IN

## 2018-08-24 DIAGNOSIS — Z78.9 OTHER SPECIFIED HEALTH STATUS: ICD-10-CM

## 2018-08-24 DIAGNOSIS — Z86.79 PERSONAL HISTORY OF OTHER DISEASES OF THE CIRCULATORY SYSTEM: ICD-10-CM

## 2018-08-24 DIAGNOSIS — Z60.2 PROBLEMS RELATED TO LIVING ALONE: ICD-10-CM

## 2018-08-24 PROCEDURE — 72170 X-RAY EXAM OF PELVIS: CPT

## 2018-08-24 PROCEDURE — 99213 OFFICE O/P EST LOW 20 MIN: CPT

## 2018-08-24 SDOH — SOCIAL STABILITY - SOCIAL INSECURITY: PROBLEMS RELATED TO LIVING ALONE: Z60.2

## 2018-09-04 ENCOUNTER — APPOINTMENT (OUTPATIENT)
Dept: ENDOCRINOLOGY | Facility: CLINIC | Age: 63
End: 2018-09-04
Payer: COMMERCIAL

## 2018-09-04 VITALS
DIASTOLIC BLOOD PRESSURE: 72 MMHG | HEART RATE: 73 BPM | HEIGHT: 64 IN | BODY MASS INDEX: 32.61 KG/M2 | WEIGHT: 191 LBS | SYSTOLIC BLOOD PRESSURE: 130 MMHG | OXYGEN SATURATION: 98 %

## 2018-09-04 PROCEDURE — 99214 OFFICE O/P EST MOD 30 MIN: CPT | Mod: 25

## 2018-09-04 RX ORDER — RISEDRONATE SODIUM 35 MG/1
35 TABLET, FILM COATED ORAL
Qty: 4 | Refills: 0 | Status: DISCONTINUED | COMMUNITY
Start: 2018-06-05 | End: 2018-09-04

## 2018-09-05 ENCOUNTER — MEDICATION RENEWAL (OUTPATIENT)
Age: 63
End: 2018-09-05

## 2018-12-07 ENCOUNTER — APPOINTMENT (OUTPATIENT)
Dept: INTERNAL MEDICINE | Facility: CLINIC | Age: 63
End: 2018-12-07
Payer: COMMERCIAL

## 2018-12-07 ENCOUNTER — NON-APPOINTMENT (OUTPATIENT)
Age: 63
End: 2018-12-07

## 2018-12-07 VITALS
OXYGEN SATURATION: 98 % | BODY MASS INDEX: 32.38 KG/M2 | SYSTOLIC BLOOD PRESSURE: 110 MMHG | HEIGHT: 64.5 IN | HEART RATE: 78 BPM | DIASTOLIC BLOOD PRESSURE: 70 MMHG | WEIGHT: 192 LBS | TEMPERATURE: 98.1 F

## 2018-12-07 VITALS — DIASTOLIC BLOOD PRESSURE: 70 MMHG | SYSTOLIC BLOOD PRESSURE: 116 MMHG

## 2018-12-07 PROCEDURE — 93000 ELECTROCARDIOGRAM COMPLETE: CPT

## 2018-12-07 PROCEDURE — 99396 PREV VISIT EST AGE 40-64: CPT | Mod: 25

## 2018-12-07 RX ORDER — CALCIUM CARBONATE 500(1250)
500 TABLET ORAL
Refills: 0 | Status: DISCONTINUED | COMMUNITY
End: 2018-12-07

## 2018-12-07 RX ORDER — COPPER GLUCONATE 2 MG
250 TABLET ORAL
Refills: 0 | Status: ACTIVE | COMMUNITY

## 2018-12-07 RX ORDER — ADHESIVE TAPE 3"X 2.3 YD
50 MCG TAPE, NON-MEDICATED TOPICAL
Refills: 0 | Status: ACTIVE | COMMUNITY

## 2018-12-07 RX ORDER — RISEDRONATE SODIUM 35 MG/1
35 TABLET, DELAYED RELEASE ORAL
Qty: 12 | Refills: 3 | Status: DISCONTINUED | COMMUNITY
Start: 2018-06-05 | End: 2018-12-07

## 2018-12-14 NOTE — HISTORY OF PRESENT ILLNESS
[FreeTextEntry1] : CPE [de-identified] : Patient thinks she is having a problem with her right hip again. She was diagnosed with probable avascular necrosis over the summer and took 3 months of Atelvia. Her pain was resolved but the past week and a half she's having recurrent pain more so when she is sitting. She will be seeing Dr. Boyd in the next week or so. She is taking Advil once a day. She's having also issues with her right shoulder with pain with certain motions. There was no trauma. Her left shoulder is fine post cortisone injection a year or so ago. She states her wrists are fine post fracture.   She is trying to do with her diet in terms of calories and salt.  She is a vegetarian. She occasionally has difficulty sleeping worrying about work issues. She is not exercising due to long work days and fatigue.  She has no chest pain, dyspnea or palpitations.  Her bowels are fine.  Her urinary frequency is improved on tolterodine.

## 2018-12-14 NOTE — ASSESSMENT
[FreeTextEntry1] : Patient blood pressure is well controlled and she'll continue on her current medications. Her blood work from December 5, 2018 was reviewed with her and is all normal. We discussed the Shingrix vaccine which she defers today. She was given a vaccine information sheet.\par She was given shoulder exercises to do for rotator cuff issues and if her pain persists she will go back to see Dr. Chow. \par She will followup with Dr. Boyd for recurrent right hip pain\par She states she has  discussed with Dr. Oviedo endocrinology her upcoming dental implants and he said that it was okay to do despite the recent Atelvia usage\par She was given a referral for her yearly mammogram.\par She will  continue with regular GYN followup\par She  will do a repeat cologard  in 2 years

## 2018-12-14 NOTE — PHYSICAL EXAM
[No Acute Distress] : no acute distress [Well Nourished] : well nourished [Well Developed] : well developed [Well-Appearing] : well-appearing [Normal Voice/Communication] : normal voice/communication [Normal Sclera/Conjunctiva] : normal sclera/conjunctiva [PERRL] : pupils equal round and reactive to light [EOMI] : extraocular movements intact [Normal Outer Ear/Nose] : the outer ears and nose were normal in appearance [Normal Oropharynx] : the oropharynx was normal [Normal TMs] : both tympanic membranes were normal [No JVD] : no jugular venous distention [Supple] : supple [No Lymphadenopathy] : no lymphadenopathy [Thyroid Normal, No Nodules] : the thyroid was normal and there were no nodules present [No Respiratory Distress] : no respiratory distress  [Clear to Auscultation] : lungs were clear to auscultation bilaterally [No Accessory Muscle Use] : no accessory muscle use [Normal Percussion] : the chest was normal to percussion [Normal Rate] : normal rate  [Regular Rhythm] : with a regular rhythm [Normal S1, S2] : normal S1 and S2 [No Murmur] : no murmur heard [No Carotid Bruits] : no carotid bruits [No Abdominal Bruit] : a ~M bruit was not heard ~T in the abdomen [No Varicosities] : no varicosities [Pedal Pulses Present] : the pedal pulses are present [No Edema] : there was no peripheral edema [No Extremity Clubbing/Cyanosis] : no extremity clubbing/cyanosis [Normal Appearance] : normal in appearance [No Nipple Discharge] : no nipple discharge [No Axillary Lymphadenopathy] : no axillary lymphadenopathy [Soft] : abdomen soft [Non Tender] : non-tender [Non-distended] : non-distended [No Masses] : no abdominal mass palpated [No HSM] : no HSM [Normal Bowel Sounds] : normal bowel sounds [Normal Supraclavicular Nodes] : no supraclavicular lymphadenopathy [Normal Axillary Nodes] : no axillary lymphadenopathy [Normal Posterior Cervical Nodes] : no posterior cervical lymphadenopathy [Normal Anterior Cervical Nodes] : no anterior cervical lymphadenopathy [Normal Inguinal Nodes] : no inguinal lymphadenopathy [No CVA Tenderness] : no CVA  tenderness [No Spinal Tenderness] : no spinal tenderness [No Joint Swelling] : no joint swelling [Grossly Normal Strength/Tone] : grossly normal strength/tone [No Rash] : no rash [No Skin Lesions] : no skin lesions [Normal Gait] : normal gait [Coordination Grossly Intact] : coordination grossly intact [No Focal Deficits] : no focal deficits [Deep Tendon Reflexes (DTR)] : deep tendon reflexes were 2+ and symmetric [Speech Grossly Normal] : speech grossly normal [Memory Grossly Normal] : memory grossly normal [Normal Affect] : the affect was normal [Alert and Oriented x3] : oriented to person, place, and time [Normal Mood] : the mood was normal [Normal Insight/Judgement] : insight and judgment were intact [de-identified] : Overweight [de-identified] : Impingement sign on the right shoulder

## 2018-12-14 NOTE — HEALTH RISK ASSESSMENT
[None] : None [Alone] : lives alone [Employed] : employed [College] : College [Fully functional (bathing, dressing, toileting, transferring, walking, feeding)] : Fully functional (bathing, dressing, toileting, transferring, walking, feeding) [Fully functional (using the telephone, shopping, preparing meals, housekeeping, doing laundry, using] : Fully functional and needs no help or supervision to perform IADLs (using the telephone, shopping, preparing meals, housekeeping, doing laundry, using transportation, managing medications and managing finances) [Seat Belt] :  uses seat belt [Sunscreen] : uses sunscreen [No falls in past year] : Patient reported no falls in the past year [0] : 2) Feeling down, depressed, or hopeless: Not at all (0) [Patient reported PAP Smear was normal] : Patient reported PAP Smear was normal [] :  [Sexually Active] : sexually active [Feels Safe at Home] : Feels safe at home [Smoke Detector] : smoke detector [Carbon Monoxide Detector] : carbon monoxide detector [] : No [de-identified] : rare social [[id number]] : 0 [Change in mental status noted] : No change in mental status noted [Language] : denies difficulty with language [Behavior] : denies difficulty with behavior [Learning/Retaining New Information] : denies difficulty learning/retaining new information [Handling Complex Tasks] : denies difficulty handling complex tasks [Reasoning] : denies difficulty with reasoning [Spatial Ability and Orientation] : denies difficulty with spatial ability and orientation [High Risk Behavior] : no high risk behavior [Reports changes in hearing] : Reports no changes in hearing [Reports changes in dental health] : Reports no changes in dental health [MammogramDate] : 7/2017 [MammogramComments] : birads 1 [PapSmearDate] : 201 [BoneDensityDate] : 9/2018 [BoneDensityComments] : minimal osteopenia [ColonoscopyComments] : Cologard negative 2/2018 [HepatitisCDate] : 06/2014 [HepatitisCComments] : negative

## 2018-12-14 NOTE — DATA REVIEWED
[FreeTextEntry1] : Blood work from December 5, 2018 was reviewed with patient her CBC comprehensive metabolic panel was normal cholesterol 195 HDL 75 LDL 99 triglycerides 105 urinalysis was normal thyroid functions normal vitamin D 36.4 hemoglobin A1c 5.4\par EKG..NSR normal record

## 2018-12-14 NOTE — REVIEW OF SYSTEMS
[Heartburn] : heartburn [Incontinence] : incontinence [Joint Pain] : joint pain [Negative] : Heme/Lymph [FreeTextEntry3] : Wears glasses [FreeTextEntry7] : Heartburn intermittent. Better since she's been on probiotic. No dysphagia. [FreeTextEntry9] : Right hip and right shoulder

## 2018-12-18 ENCOUNTER — RX RENEWAL (OUTPATIENT)
Age: 63
End: 2018-12-18

## 2018-12-18 ENCOUNTER — APPOINTMENT (OUTPATIENT)
Dept: ORTHOPEDIC SURGERY | Facility: CLINIC | Age: 63
End: 2018-12-18
Payer: COMMERCIAL

## 2018-12-18 VITALS — DIASTOLIC BLOOD PRESSURE: 80 MMHG | HEART RATE: 66 BPM | SYSTOLIC BLOOD PRESSURE: 127 MMHG

## 2018-12-18 PROCEDURE — 99213 OFFICE O/P EST LOW 20 MIN: CPT

## 2018-12-18 PROCEDURE — 72170 X-RAY EXAM OF PELVIS: CPT

## 2019-01-31 ENCOUNTER — FORM ENCOUNTER (OUTPATIENT)
Age: 64
End: 2019-01-31

## 2019-02-01 ENCOUNTER — APPOINTMENT (OUTPATIENT)
Dept: MAMMOGRAPHY | Facility: CLINIC | Age: 64
End: 2019-02-01

## 2019-02-01 ENCOUNTER — OUTPATIENT (OUTPATIENT)
Dept: OUTPATIENT SERVICES | Facility: HOSPITAL | Age: 64
LOS: 1 days | End: 2019-02-01
Payer: COMMERCIAL

## 2019-02-01 DIAGNOSIS — Z90.49 ACQUIRED ABSENCE OF OTHER SPECIFIED PARTS OF DIGESTIVE TRACT: Chronic | ICD-10-CM

## 2019-02-01 DIAGNOSIS — Z00.8 ENCOUNTER FOR OTHER GENERAL EXAMINATION: ICD-10-CM

## 2019-02-01 DIAGNOSIS — N85.8 OTHER SPECIFIED NONINFLAMMATORY DISORDERS OF UTERUS: Chronic | ICD-10-CM

## 2019-02-01 PROCEDURE — 77063 BREAST TOMOSYNTHESIS BI: CPT | Mod: 26

## 2019-02-01 PROCEDURE — 77067 SCR MAMMO BI INCL CAD: CPT

## 2019-02-01 PROCEDURE — 77063 BREAST TOMOSYNTHESIS BI: CPT

## 2019-02-01 PROCEDURE — 77067 SCR MAMMO BI INCL CAD: CPT | Mod: 26

## 2019-04-17 ENCOUNTER — RX RENEWAL (OUTPATIENT)
Age: 64
End: 2019-04-17

## 2019-08-08 ENCOUNTER — RX RENEWAL (OUTPATIENT)
Age: 64
End: 2019-08-08

## 2019-08-26 ENCOUNTER — OUTPATIENT (OUTPATIENT)
Dept: OUTPATIENT SERVICES | Facility: HOSPITAL | Age: 64
LOS: 1 days | End: 2019-08-26
Payer: COMMERCIAL

## 2019-08-26 ENCOUNTER — APPOINTMENT (OUTPATIENT)
Dept: RADIOLOGY | Facility: CLINIC | Age: 64
End: 2019-08-26
Payer: COMMERCIAL

## 2019-08-26 DIAGNOSIS — N85.8 OTHER SPECIFIED NONINFLAMMATORY DISORDERS OF UTERUS: Chronic | ICD-10-CM

## 2019-08-26 DIAGNOSIS — Z90.49 ACQUIRED ABSENCE OF OTHER SPECIFIED PARTS OF DIGESTIVE TRACT: Chronic | ICD-10-CM

## 2019-08-26 DIAGNOSIS — Z00.8 ENCOUNTER FOR OTHER GENERAL EXAMINATION: ICD-10-CM

## 2019-08-26 PROCEDURE — 73630 X-RAY EXAM OF FOOT: CPT

## 2019-08-27 PROCEDURE — 73630 X-RAY EXAM OF FOOT: CPT | Mod: 26,RT

## 2019-10-02 PROBLEM — Z60.2 PERSON LIVING ALONE: Status: ACTIVE | Noted: 2017-02-16

## 2019-10-02 PROBLEM — Z60.2 PERSON LIVING ALONE: Status: ACTIVE | Noted: 2018-05-09

## 2019-12-06 ENCOUNTER — RX RENEWAL (OUTPATIENT)
Age: 64
End: 2019-12-06

## 2019-12-09 ENCOUNTER — RX RENEWAL (OUTPATIENT)
Age: 64
End: 2019-12-09

## 2019-12-11 DIAGNOSIS — I49.3 VENTRICULAR PREMATURE DEPOLARIZATION: ICD-10-CM

## 2019-12-20 ENCOUNTER — LABORATORY RESULT (OUTPATIENT)
Age: 64
End: 2019-12-20

## 2020-01-02 ENCOUNTER — APPOINTMENT (OUTPATIENT)
Dept: INTERNAL MEDICINE | Facility: CLINIC | Age: 65
End: 2020-01-02
Payer: COMMERCIAL

## 2020-01-31 ENCOUNTER — RESULT REVIEW (OUTPATIENT)
Age: 65
End: 2020-01-31

## 2020-02-06 ENCOUNTER — APPOINTMENT (OUTPATIENT)
Dept: INTERNAL MEDICINE | Facility: CLINIC | Age: 65
End: 2020-02-06
Payer: COMMERCIAL

## 2020-02-06 ENCOUNTER — NON-APPOINTMENT (OUTPATIENT)
Age: 65
End: 2020-02-06

## 2020-02-06 VITALS
DIASTOLIC BLOOD PRESSURE: 84 MMHG | TEMPERATURE: 98 F | SYSTOLIC BLOOD PRESSURE: 132 MMHG | HEIGHT: 64.5 IN | HEART RATE: 81 BPM | OXYGEN SATURATION: 99 % | WEIGHT: 186 LBS | BODY MASS INDEX: 31.37 KG/M2

## 2020-02-06 DIAGNOSIS — M75.42 IMPINGEMENT SYNDROME OF LEFT SHOULDER: ICD-10-CM

## 2020-02-06 DIAGNOSIS — M25.511 PAIN IN RIGHT SHOULDER: ICD-10-CM
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
## 2020-02-06 PROCEDURE — 36415 COLL VENOUS BLD VENIPUNCTURE: CPT

## 2020-02-06 PROCEDURE — G0444 DEPRESSION SCREEN ANNUAL: CPT | Mod: NC,59

## 2020-02-06 PROCEDURE — 93000 ELECTROCARDIOGRAM COMPLETE: CPT | Mod: 59

## 2020-02-06 PROCEDURE — 99397 PER PM REEVAL EST PAT 65+ YR: CPT | Mod: 25

## 2020-02-06 RX ORDER — HYOSCYAMINE SULFATE 0.12 MG/1
0.12 TABLET, ORALLY DISINTEGRATING ORAL
Qty: 150 | Refills: 0 | Status: COMPLETED | COMMUNITY
Start: 2020-01-22

## 2020-02-06 RX ORDER — DIPHENHYDRAMINE CITRATE AND IBUPROFEN 38; 200 MG/1; MG/1
200-38 TABLET, COATED ORAL AT BEDTIME
Refills: 0 | Status: DISCONTINUED | COMMUNITY
End: 2020-02-06

## 2020-02-06 RX ORDER — IBUPROFEN 200 MG/1
200 TABLET, COATED ORAL 3 TIMES DAILY
Refills: 0 | Status: DISCONTINUED | COMMUNITY
End: 2020-02-06

## 2020-02-06 RX ORDER — L.ACIDOPH/B.ANIMALIS/B.LONGUM 15B CELL
CAPSULE ORAL
Refills: 0 | Status: COMPLETED | COMMUNITY
End: 2020-02-06

## 2020-02-06 RX ORDER — MELATONIN 5 MG
5 CAPSULE ORAL AT BEDTIME
Refills: 0 | Status: ACTIVE | COMMUNITY

## 2020-02-06 RX ORDER — SODIUM SULFATE, POTASSIUM SULFATE, MAGNESIUM SULFATE 17.5; 3.13; 1.6 G/ML; G/ML; G/ML
17.5-3.13-1.6 SOLUTION, CONCENTRATE ORAL
Qty: 354 | Refills: 0 | Status: COMPLETED | COMMUNITY
Start: 2020-01-22

## 2020-02-07 LAB — POTASSIUM SERPL-SCNC: 4.2 MMOL/L
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
## 2020-02-09 PROBLEM — M75.42 ROTATOR CUFF IMPINGEMENT SYNDROME, LEFT: Status: RESOLVED | Noted: 2017-02-16 | Resolved: 2020-02-09

## 2020-02-09 PROBLEM — M25.511 RIGHT SHOULDER PAIN: Status: RESOLVED | Noted: 2018-12-14 | Resolved: 2020-02-09

## 2020-02-09 NOTE — HEALTH RISK ASSESSMENT
[No falls in past year] : Patient reported no falls in the past year [0] : 2) Feeling down, depressed, or hopeless: Not at all (0) [Patient reported PAP Smear was normal] : Patient reported PAP Smear was normal [None] : None [Alone] : lives alone [College] : College [Employed] : employed [] :  [Sexually Active] : sexually active [Feels Safe at Home] : Feels safe at home [Fully functional (bathing, dressing, toileting, transferring, walking, feeding)] : Fully functional (bathing, dressing, toileting, transferring, walking, feeding) [Fully functional (using the telephone, shopping, preparing meals, housekeeping, doing laundry, using] : Fully functional and needs no help or supervision to perform IADLs (using the telephone, shopping, preparing meals, housekeeping, doing laundry, using transportation, managing medications and managing finances) [Smoke Detector] : smoke detector [Carbon Monoxide Detector] : carbon monoxide detector [Seat Belt] :  uses seat belt [Sunscreen] : uses sunscreen [No] : No [Hepatitis C test offered] : Hepatitis C test offered [Reports normal functional visual acuity (ie: able to read med bottle)] : Reports normal functional visual acuity [] : No [de-identified] : none [[id number]] : 0 [de-identified] : weight watchers [Change in mental status noted] : No change in mental status noted [Language] : denies difficulty with language [Behavior] : denies difficulty with behavior [Handling Complex Tasks] : denies difficulty handling complex tasks [Learning/Retaining New Information] : denies difficulty learning/retaining new information [Reasoning] : denies difficulty with reasoning [Spatial Ability and Orientation] : denies difficulty with spatial ability and orientation [High Risk Behavior] : no high risk behavior [Reports changes in hearing] : Reports no changes in hearing [Reports changes in dental health] : Reports no changes in dental health [Reports changes in vision] : Reports no changes in vision [Guns at Home] : no guns at home [MammogramDate] : 2/2019 [Travel to Developing Areas] : does not  travel to developing areas [MammogramComments] : birads 1 [PapSmearDate] : 2020 [BoneDensityDate] : 9/2018 [BoneDensityComments] : minimal osteopenia [ColonoscopyComments] : Cologard negative 2/2018  FIT negative 2/2016 [HepatitisCComments] : negative [HepatitisCDate] : 06/2014

## 2020-02-09 NOTE — PHYSICAL EXAM
[No Acute Distress] : no acute distress [Well Nourished] : well nourished [Normal Voice/Communication] : normal voice/communication [Well Developed] : well developed [Well-Appearing] : well-appearing [Normal Sclera/Conjunctiva] : normal sclera/conjunctiva [PERRL] : pupils equal round and reactive to light [Normal Outer Ear/Nose] : the outer ears and nose were normal in appearance [EOMI] : extraocular movements intact [Normal Oropharynx] : the oropharynx was normal [Normal TMs] : both tympanic membranes were normal [No JVD] : no jugular venous distention [Supple] : supple [No Lymphadenopathy] : no lymphadenopathy [No Respiratory Distress] : no respiratory distress  [Thyroid Normal, No Nodules] : the thyroid was normal and there were no nodules present [No Accessory Muscle Use] : no accessory muscle use [Normal Percussion] : the chest was normal to percussion [Clear to Auscultation] : lungs were clear to auscultation bilaterally [Regular Rhythm] : with a regular rhythm [Normal S1, S2] : normal S1 and S2 [Normal Rate] : normal rate  [No Carotid Bruits] : no carotid bruits [No Murmur] : no murmur heard [No Abdominal Bruit] : a ~M bruit was not heard ~T in the abdomen [Pedal Pulses Present] : the pedal pulses are present [No Varicosities] : no varicosities [No Extremity Clubbing/Cyanosis] : no extremity clubbing/cyanosis [No Edema] : there was no peripheral edema [Normal Appearance] : normal in appearance [No Nipple Discharge] : no nipple discharge [Soft] : abdomen soft [No Axillary Lymphadenopathy] : no axillary lymphadenopathy [No Masses] : no abdominal mass palpated [Non-distended] : non-distended [Non Tender] : non-tender [No HSM] : no HSM [Normal Bowel Sounds] : normal bowel sounds [Normal Axillary Nodes] : no axillary lymphadenopathy [Normal Supraclavicular Nodes] : no supraclavicular lymphadenopathy [Normal Posterior Cervical Nodes] : no posterior cervical lymphadenopathy [Normal Anterior Cervical Nodes] : no anterior cervical lymphadenopathy [Normal Inguinal Nodes] : no inguinal lymphadenopathy [No Spinal Tenderness] : no spinal tenderness [No Joint Swelling] : no joint swelling [No CVA Tenderness] : no CVA  tenderness [Grossly Normal Strength/Tone] : grossly normal strength/tone [No Rash] : no rash [No Skin Lesions] : no skin lesions [Normal Gait] : normal gait [Speech Grossly Normal] : speech grossly normal [Deep Tendon Reflexes (DTR)] : deep tendon reflexes were 2+ and symmetric [Coordination Grossly Intact] : coordination grossly intact [No Focal Deficits] : no focal deficits [Memory Grossly Normal] : memory grossly normal [Normal Affect] : the affect was normal [Normal Mood] : the mood was normal [Alert and Oriented x3] : oriented to person, place, and time [Normal Insight/Judgement] : insight and judgment were intact [No Palpable Aorta] : no palpable aorta [de-identified] : Overweight

## 2020-02-09 NOTE — ASSESSMENT
[FreeTextEntry1] : Her blood pressure is controlled at the upper limit of normal. She'll continue to work on weight loss. She will begin a regular exercise program. She'll continue her same medications. She will followup with GI as advised for colonoscopy and upper endoscopy.   She will continue  Metamucil adequate fluid and proper  toileting habits. We discussed vestibular therapy physical therapy for the Epley maneuver for her intermittent vertigo. She will go for her yearly mammogram.\par A repeat potassium level was that\par We discussed the Pneumovax as well as the shingles vaccine which she refuses\par She will get a bone density in one year\par She will be seen again in 6 months

## 2020-02-09 NOTE — REVIEW OF SYSTEMS
[Heartburn] : heartburn [Incontinence] : incontinence [Joint Pain] : joint pain [FreeTextEntry9] : Right hip and right shoulder [FreeTextEntry7] : Heartburn intermittent. Better since she's been on probiotic. No dysphagia. [Negative] : Heme/Lymph

## 2020-02-09 NOTE — COUNSELING
[Potential consequences of obesity discussed] : Potential consequences of obesity discussed [Benefits of weight loss discussed] : Benefits of weight loss discussed [Encouraged to increase physical activity] : Encouraged to increase physical activity [Encouraged to maintain food diary] : Encouraged to maintain food diary [Encouraged to use exercise tracking device] : Encouraged to use exercise tracking device [____ min/wk Activity] : [unfilled] min/wk activity [Weigh Self Weekly] : weigh self weekly [Decrease Portions] : decrease portions [Keep Food Diary] : keep food diary

## 2020-02-09 NOTE — HISTORY OF PRESENT ILLNESS
[FreeTextEntry1] : CPE [de-identified] : She had been experiencing constipation and abdominal bloating .  She saw GI and is planning a colonoscopy and eventual upper endoscopy. She is on Metamucil and her bowels are better. She also went on a Fod map diet and feels her bowels are better .  She has also started to lose weight through [employer] gustavo and  change in diet.   She saw her gynecologist Dr. Busch and  is planning a pelvic sonogram .  She saw ophthalmology for borderline pressures and may have an early cataract \par For the past several months she's been getting intermittent vertigo when she lies down in bed or turns in bed. She has no tinnitus or decreased hearing.  She saw her ENT Dr Russo and had a trial of Xyzal  which was not that effective.\par Her urinary urgency is controlled on tolterodine PRN.\par She has no cardiopulmonary complaints\par She has no orthopedic complaints there have been no recurrent fall.

## 2020-02-27 ENCOUNTER — FORM ENCOUNTER (OUTPATIENT)
Age: 65
End: 2020-02-27

## 2020-02-28 ENCOUNTER — OUTPATIENT (OUTPATIENT)
Dept: OUTPATIENT SERVICES | Facility: HOSPITAL | Age: 65
LOS: 1 days | End: 2020-02-28
Payer: COMMERCIAL

## 2020-02-28 ENCOUNTER — APPOINTMENT (OUTPATIENT)
Dept: MAMMOGRAPHY | Facility: CLINIC | Age: 65
End: 2020-02-28
Payer: COMMERCIAL

## 2020-02-28 DIAGNOSIS — Z90.49 ACQUIRED ABSENCE OF OTHER SPECIFIED PARTS OF DIGESTIVE TRACT: Chronic | ICD-10-CM

## 2020-02-28 DIAGNOSIS — Z00.8 ENCOUNTER FOR OTHER GENERAL EXAMINATION: ICD-10-CM

## 2020-02-28 DIAGNOSIS — N85.8 OTHER SPECIFIED NONINFLAMMATORY DISORDERS OF UTERUS: Chronic | ICD-10-CM

## 2020-02-28 PROCEDURE — 77067 SCR MAMMO BI INCL CAD: CPT

## 2020-02-28 PROCEDURE — 77063 BREAST TOMOSYNTHESIS BI: CPT | Mod: 26

## 2020-02-28 PROCEDURE — 77067 SCR MAMMO BI INCL CAD: CPT | Mod: 26

## 2020-02-28 PROCEDURE — 77063 BREAST TOMOSYNTHESIS BI: CPT

## 2020-04-09 ENCOUNTER — APPOINTMENT (OUTPATIENT)
Dept: OTOLARYNGOLOGY | Facility: CLINIC | Age: 65
End: 2020-04-09
Payer: COMMERCIAL

## 2020-04-09 PROCEDURE — 99204 OFFICE O/P NEW MOD 45 MIN: CPT | Mod: 95

## 2020-04-09 NOTE — HISTORY OF PRESENT ILLNESS
[Other Location: e.g. School (Enter Location, City,State)___] : at [unfilled], at the time of the visit. [Other Location: e.g. Home (Enter Location, City,State)___] : at [unfilled] [Patient] : the patient [Self] : self [de-identified] : 63yo woman with episodic vertigo and falls\par she has fallen on several (4+ ) different occasions\par + wrist fracture\par walking down stairs or after being in a car then stationary\par last fall was 2 yrs ago\par also experiencing vertigo\par specifically when she lays in bed and turns to either side\par feels a "wave" which subsides in seconds\par then back to baseline\par she also has disequilibrium with walking\par on most days she feels it\par does not feel it when stationary or sitting down\par worse in certain seasons\par no nausea, headaches or photophobia\par she was seeing VRT where she had R BPPV but also a persistent weakness of the R inner ear\par she has no h/o hearing loss\par was at ENT last year, states hearing 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

## 2020-05-02 ENCOUNTER — APPOINTMENT (OUTPATIENT)
Dept: DISASTER EMERGENCY | Facility: CLINIC | Age: 65
End: 2020-05-02

## 2020-05-04 LAB
SARS-COV-2 IGG SERPL IA-ACNC: <0.1 INDEX
SARS-COV-2 IGG SERPL QL IA: NEGATIVE

## 2020-05-15 ENCOUNTER — RX RENEWAL (OUTPATIENT)
Age: 65
End: 2020-05-15

## 2020-05-19 ENCOUNTER — APPOINTMENT (OUTPATIENT)
Dept: MRI IMAGING | Facility: CLINIC | Age: 65
End: 2020-05-19

## 2020-06-26 ENCOUNTER — RX RENEWAL (OUTPATIENT)
Age: 65
End: 2020-06-26

## 2020-08-10 ENCOUNTER — RX RENEWAL (OUTPATIENT)
Age: 65
End: 2020-08-10

## 2020-09-10 ENCOUNTER — OUTPATIENT (OUTPATIENT)
Dept: OUTPATIENT SERVICES | Facility: HOSPITAL | Age: 65
LOS: 1 days | End: 2020-09-10
Payer: COMMERCIAL

## 2020-09-10 ENCOUNTER — APPOINTMENT (OUTPATIENT)
Dept: MRI IMAGING | Facility: CLINIC | Age: 65
End: 2020-09-10
Payer: COMMERCIAL

## 2020-09-10 DIAGNOSIS — Z00.8 ENCOUNTER FOR OTHER GENERAL EXAMINATION: ICD-10-CM

## 2020-09-10 DIAGNOSIS — Z90.49 ACQUIRED ABSENCE OF OTHER SPECIFIED PARTS OF DIGESTIVE TRACT: Chronic | ICD-10-CM

## 2020-09-10 DIAGNOSIS — N85.8 OTHER SPECIFIED NONINFLAMMATORY DISORDERS OF UTERUS: Chronic | ICD-10-CM

## 2020-09-10 PROCEDURE — 70553 MRI BRAIN STEM W/O & W/DYE: CPT | Mod: 26

## 2020-09-10 PROCEDURE — A9585: CPT

## 2020-09-10 PROCEDURE — 70553 MRI BRAIN STEM W/O & W/DYE: CPT

## 2021-03-01 ENCOUNTER — LABORATORY RESULT (OUTPATIENT)
Age: 66
End: 2021-03-01

## 2021-03-05 ENCOUNTER — NON-APPOINTMENT (OUTPATIENT)
Age: 66
End: 2021-03-05

## 2021-03-05 ENCOUNTER — APPOINTMENT (OUTPATIENT)
Dept: INTERNAL MEDICINE | Facility: CLINIC | Age: 66
End: 2021-03-05
Payer: MEDICARE

## 2021-03-05 ENCOUNTER — RESULT REVIEW (OUTPATIENT)
Age: 66
End: 2021-03-05

## 2021-03-05 VITALS
BODY MASS INDEX: 33.12 KG/M2 | HEIGHT: 64 IN | DIASTOLIC BLOOD PRESSURE: 84 MMHG | SYSTOLIC BLOOD PRESSURE: 124 MMHG | OXYGEN SATURATION: 99 % | HEART RATE: 77 BPM | TEMPERATURE: 97.16 F | WEIGHT: 194 LBS

## 2021-03-05 DIAGNOSIS — R82.90 UNSPECIFIED ABNORMAL FINDINGS IN URINE: ICD-10-CM

## 2021-03-05 DIAGNOSIS — H81.91 UNSPECIFIED DISORDER OF VESTIBULAR FUNCTION, RIGHT EAR: ICD-10-CM

## 2021-03-05 DIAGNOSIS — Z79.899 OTHER LONG TERM (CURRENT) DRUG THERAPY: ICD-10-CM

## 2021-03-05 DIAGNOSIS — M87.051 IDIOPATHIC ASEPTIC NECROSIS OF RIGHT FEMUR: ICD-10-CM

## 2021-03-05 DIAGNOSIS — H81.10 BENIGN PAROXYSMAL VERTIGO, UNSPECIFIED EAR: ICD-10-CM

## 2021-03-05 DIAGNOSIS — R14.0 ABDOMINAL DISTENSION (GASEOUS): ICD-10-CM

## 2021-03-05 DIAGNOSIS — Z13.31 ENCOUNTER FOR SCREENING FOR DEPRESSION: ICD-10-CM

## 2021-03-05 DIAGNOSIS — Z86.39 PERSONAL HISTORY OF OTHER ENDOCRINE, NUTRITIONAL AND METABOLIC DISEASE: ICD-10-CM

## 2021-03-05 DIAGNOSIS — Z87.19 PERSONAL HISTORY OF OTHER DISEASES OF THE DIGESTIVE SYSTEM: ICD-10-CM

## 2021-03-05 PROCEDURE — G0402 INITIAL PREVENTIVE EXAM: CPT

## 2021-03-05 PROCEDURE — G0403: CPT

## 2021-03-05 PROCEDURE — 99213 OFFICE O/P EST LOW 20 MIN: CPT | Mod: 25

## 2021-03-05 RX ORDER — AMLODIPINE AND OLMESARTAN MEDOXOMIL 5; 20 MG/1; MG/1
5-20 TABLET ORAL
Qty: 90 | Refills: 2 | Status: DISCONTINUED | COMMUNITY
Start: 2017-03-27 | End: 2021-03-05

## 2021-03-05 RX ORDER — METRONIDAZOLE 500 MG/1
5 TABLET ORAL
Refills: 0 | Status: DISCONTINUED | COMMUNITY
End: 2021-03-05

## 2021-03-05 RX ORDER — PSYLLIUM SEED
PACKET (EA) ORAL
Refills: 0 | Status: ACTIVE | COMMUNITY

## 2021-03-05 NOTE — HISTORY OF PRESENT ILLNESS
[FreeTextEntry1] :  Initial preventive Medicare visit\par Hypertension\par Urinary frequency [de-identified] : She has been retired since November 30 and is doing well. She started walking 2 miles 3 times a week and sometimes it is limited due to knee pain. She is trying to watch her diet to promote weight loss. She uses a rare Tylenol or Advil for knee pain. She had seen ENT for ongoing vestibular dysfunction and had a negative MRI of her brain. She subsequently had in the maneuver at physical therapy and her vertigo has resolved. She needs changes in her antihypertensive and bladder medication due to formulary concerns. She has no chest pain shortness of breath or palpitations.  Her bowels are improved on Metamucil every day. Her reflux is controlled on omeprazole which if she stops and have breakthrough.

## 2021-03-05 NOTE — PHYSICAL EXAM
[No Acute Distress] : no acute distress [Well Nourished] : well nourished [Well Developed] : well developed [Well-Appearing] : well-appearing [Normal Voice/Communication] : normal voice/communication [Normal Sclera/Conjunctiva] : normal sclera/conjunctiva [PERRL] : pupils equal round and reactive to light [EOMI] : extraocular movements intact [Normal Outer Ear/Nose] : the outer ears and nose were normal in appearance [Normal Oropharynx] : the oropharynx was normal [Normal TMs] : both tympanic membranes were normal [No JVD] : no jugular venous distention [Supple] : supple [No Lymphadenopathy] : no lymphadenopathy [Thyroid Normal, No Nodules] : the thyroid was normal and there were no nodules present [No Respiratory Distress] : no respiratory distress  [Clear to Auscultation] : lungs were clear to auscultation bilaterally [No Accessory Muscle Use] : no accessory muscle use [Normal Percussion] : the chest was normal to percussion [Normal Rate] : normal rate  [Regular Rhythm] : with a regular rhythm [Normal S1, S2] : normal S1 and S2 [No Murmur] : no murmur heard [No Carotid Bruits] : no carotid bruits [No Abdominal Bruit] : a ~M bruit was not heard ~T in the abdomen [No Varicosities] : no varicosities [Pedal Pulses Present] : the pedal pulses are present [No Edema] : there was no peripheral edema [No Extremity Clubbing/Cyanosis] : no extremity clubbing/cyanosis [No Palpable Aorta] : no palpable aorta [Normal Appearance] : normal in appearance [No Nipple Discharge] : no nipple discharge [No Axillary Lymphadenopathy] : no axillary lymphadenopathy [Soft] : abdomen soft [Non Tender] : non-tender [Non-distended] : non-distended [No Masses] : no abdominal mass palpated [No HSM] : no HSM [Normal Bowel Sounds] : normal bowel sounds [Normal Supraclavicular Nodes] : no supraclavicular lymphadenopathy [Normal Axillary Nodes] : no axillary lymphadenopathy [Normal Posterior Cervical Nodes] : no posterior cervical lymphadenopathy [Normal Anterior Cervical Nodes] : no anterior cervical lymphadenopathy [Normal Inguinal Nodes] : no inguinal lymphadenopathy [No CVA Tenderness] : no CVA  tenderness [No Spinal Tenderness] : no spinal tenderness [No Joint Swelling] : no joint swelling [Grossly Normal Strength/Tone] : grossly normal strength/tone [No Rash] : no rash [No Skin Lesions] : no skin lesions [Normal Gait] : normal gait [Coordination Grossly Intact] : coordination grossly intact [No Focal Deficits] : no focal deficits [Deep Tendon Reflexes (DTR)] : deep tendon reflexes were 2+ and symmetric [Speech Grossly Normal] : speech grossly normal [Memory Grossly Normal] : memory grossly normal [Normal Affect] : the affect was normal [Alert and Oriented x3] : oriented to person, place, and time [Normal Mood] : the mood was normal [Normal Insight/Judgement] : insight and judgment were intact [de-identified] : Overweight

## 2021-03-05 NOTE — HEALTH RISK ASSESSMENT
[No falls in past year] : Patient reported no falls in the past year [0] : 2) Feeling down, depressed, or hopeless: Not at all (0) [Patient reported PAP Smear was normal] : Patient reported PAP Smear was normal [Patient reported colonoscopy was normal] : Patient reported colonoscopy was normal [Hepatitis C test offered] : Hepatitis C test offered [None] : None [Alone] : lives alone [Employed] : employed [College] : College [] :  [Sexually Active] : sexually active [Feels Safe at Home] : Feels safe at home [Fully functional (bathing, dressing, toileting, transferring, walking, feeding)] : Fully functional (bathing, dressing, toileting, transferring, walking, feeding) [Fully functional (using the telephone, shopping, preparing meals, housekeeping, doing laundry, using] : Fully functional and needs no help or supervision to perform IADLs (using the telephone, shopping, preparing meals, housekeeping, doing laundry, using transportation, managing medications and managing finances) [Reports normal functional visual acuity (ie: able to read med bottle)] : Reports normal functional visual acuity [Smoke Detector] : smoke detector [Carbon Monoxide Detector] : carbon monoxide detector [Seat Belt] :  uses seat belt [Sunscreen] : uses sunscreen [With Patient/Caregiver] : With Patient/Caregiver [Designated Healthcare Proxy] : Designated healthcare proxy [Name: ___] : Health Care Proxy's Name: [unfilled]  [Relationship: ___] : Relationship: [unfilled] [DNR] : DNR [DNI] : DNI [No] : In the past 12 months have you used drugs other than those required for medical reasons? No [] : No [de-identified] : none [de-identified] : weight watchers [[id number]] : 0 [Change in mental status noted] : No change in mental status noted [Language] : denies difficulty with language [Behavior] : denies difficulty with behavior [Learning/Retaining New Information] : denies difficulty learning/retaining new information [Handling Complex Tasks] : denies difficulty handling complex tasks [Reasoning] : denies difficulty with reasoning [Spatial Ability and Orientation] : denies difficulty with spatial ability and orientation [High Risk Behavior] : no high risk behavior [Reports changes in hearing] : Reports no changes in hearing [Reports changes in vision] : Reports no changes in vision [Reports changes in dental health] : Reports no changes in dental health [Guns at Home] : no guns at home [Travel to Developing Areas] : does not  travel to developing areas [MammogramDate] : 2/2020 [MammogramComments] : birads 1 [PapSmearDate] : 2020 [BoneDensityDate] : 9/2018 [BoneDensityComments] : minimal osteopenia [ColonoscopyDate] : 3/2020 [HepatitisCDate] : 06/2014 [HepatitisCComments] : negative [AdvancecareDate] : 03/2021

## 2021-03-05 NOTE — ASSESSMENT
[FreeTextEntry1] : Her blood pressure is controlled. Due to insurance formulary issues we'll change her amlodipine-olmesartan  to amlodipine valsartan. She will monitor her blood pressure at home with her home monitor and call if it is greater than 130/80. The same reason we will change her told her to to Ditropan.\par Her recent blood work was reviewed with her at all as in order\par She will go for her yearly mammogram and GYN followup. She will get a followup bone density due to chronic PPI use\par She was advised to get Pneumovax 23 tetanus booster and  Shingrix vaccine in several months. She will complete her Covid 19 vaccination series.\par She was encouraged to work on weight loss with diet and exercise.\par Advanced directives were reviewed and the patient has them in place

## 2021-03-08 ENCOUNTER — APPOINTMENT (OUTPATIENT)
Dept: OBGYN | Facility: CLINIC | Age: 66
End: 2021-03-08
Payer: MEDICARE

## 2021-03-08 VITALS
DIASTOLIC BLOOD PRESSURE: 78 MMHG | BODY MASS INDEX: 33.29 KG/M2 | HEIGHT: 64 IN | SYSTOLIC BLOOD PRESSURE: 130 MMHG | WEIGHT: 195 LBS

## 2021-03-08 LAB
BILIRUB UR QL STRIP: NORMAL
CLARITY UR: CLEAR
COLLECTION METHOD: NORMAL
GLUCOSE UR-MCNC: NORMAL
HCG UR QL: 0.2 EU/DL
HGB UR QL STRIP.AUTO: NORMAL
KETONES UR-MCNC: NORMAL
LEUKOCYTE ESTERASE UR QL STRIP: NORMAL
NITRITE UR QL STRIP: NORMAL
PH UR STRIP: 5.5
PROT UR STRIP-MCNC: NORMAL
SP GR UR STRIP: NORMAL

## 2021-03-08 PROCEDURE — 81003 URINALYSIS AUTO W/O SCOPE: CPT | Mod: QW

## 2021-03-08 PROCEDURE — G0438: CPT

## 2021-03-08 PROCEDURE — G0402 INITIAL PREVENTIVE EXAM: CPT

## 2021-03-08 PROCEDURE — G0101: CPT

## 2021-03-08 PROCEDURE — 82270 OCCULT BLOOD FECES: CPT

## 2021-03-09 LAB — HPV HIGH+LOW RISK DNA PNL CVX: NOT DETECTED

## 2021-03-11 LAB — CYTOLOGY CVX/VAG DOC THIN PREP: ABNORMAL

## 2021-03-12 ENCOUNTER — RESULT REVIEW (OUTPATIENT)
Age: 66
End: 2021-03-12

## 2021-03-12 ENCOUNTER — APPOINTMENT (OUTPATIENT)
Dept: RADIOLOGY | Facility: CLINIC | Age: 66
End: 2021-03-12
Payer: MEDICARE

## 2021-03-12 ENCOUNTER — APPOINTMENT (OUTPATIENT)
Dept: MAMMOGRAPHY | Facility: CLINIC | Age: 66
End: 2021-03-12
Payer: MEDICARE

## 2021-03-12 ENCOUNTER — OUTPATIENT (OUTPATIENT)
Dept: OUTPATIENT SERVICES | Facility: HOSPITAL | Age: 66
LOS: 1 days | End: 2021-03-12
Payer: MEDICARE

## 2021-03-12 DIAGNOSIS — Z90.49 ACQUIRED ABSENCE OF OTHER SPECIFIED PARTS OF DIGESTIVE TRACT: Chronic | ICD-10-CM

## 2021-03-12 DIAGNOSIS — Z12.39 ENCOUNTER FOR OTHER SCREENING FOR MALIGNANT NEOPLASM OF BREAST: ICD-10-CM

## 2021-03-12 DIAGNOSIS — N85.8 OTHER SPECIFIED NONINFLAMMATORY DISORDERS OF UTERUS: Chronic | ICD-10-CM

## 2021-03-12 PROCEDURE — 77067 SCR MAMMO BI INCL CAD: CPT | Mod: 26

## 2021-03-12 PROCEDURE — 77085 DXA BONE DENSITY AXL VRT FX: CPT | Mod: 26

## 2021-03-12 PROCEDURE — 77063 BREAST TOMOSYNTHESIS BI: CPT

## 2021-03-12 PROCEDURE — 77085 DXA BONE DENSITY AXL VRT FX: CPT

## 2021-03-12 PROCEDURE — 77063 BREAST TOMOSYNTHESIS BI: CPT | Mod: 26

## 2021-03-12 PROCEDURE — 77067 SCR MAMMO BI INCL CAD: CPT

## 2021-03-18 ENCOUNTER — NON-APPOINTMENT (OUTPATIENT)
Age: 66
End: 2021-03-18

## 2021-04-21 ENCOUNTER — APPOINTMENT (OUTPATIENT)
Dept: ORTHOPEDIC SURGERY | Facility: CLINIC | Age: 66
End: 2021-04-21
Payer: MEDICARE

## 2021-04-21 DIAGNOSIS — M51.37 OTHER INTERVERTEBRAL DISC DEGENERATION, LUMBOSACRAL REGION: ICD-10-CM

## 2021-04-21 PROCEDURE — 73522 X-RAY EXAM HIPS BI 3-4 VIEWS: CPT

## 2021-04-21 PROCEDURE — 99213 OFFICE O/P EST LOW 20 MIN: CPT | Mod: 95

## 2021-04-21 PROCEDURE — 72100 X-RAY EXAM L-S SPINE 2/3 VWS: CPT

## 2021-04-21 NOTE — PHYSICAL EXAM
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  She was treated by Dr. Oviedo  has put her on Risodrinate once a week calcium 500 mg and vitamin D 10,000 units for a 6-week program.  Since then she has done generally well.  She is now having some discomfort toward her hip but not over her groin more at the site of her hip and going down the side of her leg.  Her hip motion is excellent and symmetric with flexion of 135, abduction of the 80 adduction of 30 external rotation 80 and internal rotation 20.  Her pain does not appear to be from her hip but more of the pain that can be other radiating possibly from her back her deep tendon reflexes motor and sensory are symmetric but this does not rule out some radicular pain.\par \par Her knees both have excellent motion with 0 to 135 degrees good medial lateral and anterior posterior stability.  She has no effusion and no Baker's cyst.\par \par \par \par \par . Her motion is symmetric although he abduction and rotation she does have more discomfort in her right hip. She has flexion of 135°, abduction 75°, adduction 35°, external rotation 75°, and internal rotation 20°. She does have pain however motion in her right hip and with ambulation\par \par  [de-identified] : AP of the pelvis and a lateral of the patient's right hip show slight increased small osteophyte off the inferior as the femoral heads are round there are no x-ray signs which are consistent with avascular necrosis that can be seen by x-ray at this time.

## 2021-04-21 NOTE — HISTORY OF PRESENT ILLNESS
[Other Location: e.g. School (Enter Location, City,State)___] : at [unfilled], at the time of the visit. [Other Location: e.g. Home (Enter Location, City,State)___] : at [unfilled] [Verbal consent obtained from patient] : the patient, [unfilled] [de-identified] : Pt is a 64 y/o female with right hip AVN. The hip pain recurred approximately 5 weeks ago and is intermittent. Pain aggravated with sitting, pain is along the postero-lateral aspect of the hip. She has no difficulty putting on socks or shoes. She has pain with walking up and down stairs. She presents for follow up xray. She is under Dr. Oviedo (endocrinologist) management for risedronate, which was discontinued early September 2018. She has been complaining of right knee pain as well and isn't sure if this is radiating from her right hip or is its own issue.

## 2021-04-21 NOTE — DISCUSSION/SUMMARY
[de-identified] : This patient did have avascular necrosis of her right hip in 2018.  She appears however to have done well in the femoral head is still round and there is no evidence of collapse and no present line seen.  Her pain is more typical of some pain from her low back which she does have intermittent back pain and this could be radicular going down the side of her right leg.  At this time she will follow conservative back measures and may try Celebrex 200 mg a day.  If her pain persists she will get an MRI of her lumbar spine.  Return visit in 3 months as needed.\par \par The total time in this encounter with the patient, including review of the records reviewing x-rays and seeing the patient,  was over 20 minutes

## 2021-06-07 ENCOUNTER — TRANSCRIPTION ENCOUNTER (OUTPATIENT)
Age: 66
End: 2021-06-07

## 2021-07-19 ENCOUNTER — RX RENEWAL (OUTPATIENT)
Age: 66
End: 2021-07-19

## 2021-08-16 ENCOUNTER — APPOINTMENT (OUTPATIENT)
Dept: INTERNAL MEDICINE | Facility: CLINIC | Age: 66
End: 2021-08-16
Payer: MEDICARE

## 2021-08-16 VITALS
HEIGHT: 63.5 IN | WEIGHT: 184 LBS | HEART RATE: 82 BPM | TEMPERATURE: 96.08 F | SYSTOLIC BLOOD PRESSURE: 134 MMHG | BODY MASS INDEX: 32.2 KG/M2 | OXYGEN SATURATION: 99 % | DIASTOLIC BLOOD PRESSURE: 82 MMHG

## 2021-08-16 DIAGNOSIS — K57.30 DIVERTICULOSIS OF LARGE INTESTINE W/OUT PERFORATION OR ABSCESS W/OUT BLEEDING: ICD-10-CM

## 2021-08-16 PROCEDURE — 99214 OFFICE O/P EST MOD 30 MIN: CPT

## 2021-08-17 NOTE — HISTORY OF PRESENT ILLNESS
[de-identified] : Past several weeks when she looks down she feels she has a lump on the left side of her umbilicus. this disappears when lies down. She's concerned perhaps it might be a hernia. She has not been straining at stool coughing or straining  at urine. She does some times lift heavy objects. She has no nausea or vomiting.  Her  bowels are generally good. Several months ago she had an episode of rectal urgency and saw  a tiny bit of rectal bleeding and mucus but this has not recurred. [FreeTextEntry1] : abdominal swelling

## 2021-08-17 NOTE — ASSESSMENT
[FreeTextEntry1] : Patient appears to have an umbilical hernia which is reducible. We discussed the nature of this and the signs and symptoms of incarceration. I am not sure if this has anything to do with her prior laparoscopic cholecystectomy. She will go back to see Gen. surgery. We discussed avoiding activities that increase intra-abdominal pressure\par Her blood pressure is controlled\par We discussed taking daily fiber to regularize her bowels. Her last colonoscopy in 2020 revealed diverticulosis

## 2021-08-17 NOTE — PHYSICAL EXAM
[No Acute Distress] : no acute distress [Well Nourished] : well nourished [Well Developed] : well developed [Well-Appearing] : well-appearing [Normal Sclera/Conjunctiva] : normal sclera/conjunctiva [PERRL] : pupils equal round and reactive to light [EOMI] : extraocular movements intact [Normal Outer Ear/Nose] : the outer ears and nose were normal in appearance [Normal Oropharynx] : the oropharynx was normal [No JVD] : no jugular venous distention [Supple] : supple [No Lymphadenopathy] : no lymphadenopathy [Thyroid Normal, No Nodules] : the thyroid was normal and there were no nodules present [No Respiratory Distress] : no respiratory distress  [No Accessory Muscle Use] : no accessory muscle use [Clear to Auscultation] : lungs were clear to auscultation bilaterally [Normal Rate] : normal rate  [Regular Rhythm] : with a regular rhythm [Normal S1, S2] : normal S1 and S2 [No Murmur] : no murmur heard [No Carotid Bruits] : no carotid bruits [No Abdominal Bruit] : a ~M bruit was not heard ~T in the abdomen [No Varicosities] : no varicosities [Pedal Pulses Present] : the pedal pulses are present [No Edema] : there was no peripheral edema [No Palpable Aorta] : no palpable aorta [No Extremity Clubbing/Cyanosis] : no extremity clubbing/cyanosis [Soft] : abdomen soft [Non-distended] : non-distended [Non Tender] : non-tender [No HSM] : no HSM [Normal Bowel Sounds] : normal bowel sounds [Normal Posterior Cervical Nodes] : no posterior cervical lymphadenopathy [No CVA Tenderness] : no CVA  tenderness [Normal Anterior Cervical Nodes] : no anterior cervical lymphadenopathy [No Spinal Tenderness] : no spinal tenderness [No Rash] : no rash [Normal Gait] : normal gait [Deep Tendon Reflexes (DTR)] : deep tendon reflexes were 2+ and symmetric [Speech Grossly Normal] : speech grossly normal [Memory Grossly Normal] : memory grossly normal [Normal Affect] : the affect was normal [Normal Mood] : the mood was normal [Normal Insight/Judgement] : insight and judgment were intact [Normal Voice/Communication] : normal voice/communication [Normal TMs] : both tympanic membranes were normal [Normal Percussion] : the chest was normal to percussion [de-identified] : 4-5 cm reducible umbilical hernia at 3 to 6:00

## 2021-09-07 ENCOUNTER — APPOINTMENT (OUTPATIENT)
Dept: INTERNAL MEDICINE | Facility: CLINIC | Age: 66
End: 2021-09-07
Payer: MEDICARE

## 2021-09-07 ENCOUNTER — NON-APPOINTMENT (OUTPATIENT)
Age: 66
End: 2021-09-07

## 2021-09-07 VITALS
HEART RATE: 83 BPM | HEIGHT: 64 IN | DIASTOLIC BLOOD PRESSURE: 70 MMHG | TEMPERATURE: 98 F | OXYGEN SATURATION: 99 % | SYSTOLIC BLOOD PRESSURE: 124 MMHG | WEIGHT: 184 LBS | BODY MASS INDEX: 31.41 KG/M2

## 2021-09-07 DIAGNOSIS — Z23 ENCOUNTER FOR IMMUNIZATION: ICD-10-CM

## 2021-09-07 DIAGNOSIS — R09.89 OTHER SPECIFIED SYMPTOMS AND SIGNS INVOLVING THE CIRCULATORY AND RESPIRATORY SYSTEMS: ICD-10-CM

## 2021-09-07 PROCEDURE — 99214 OFFICE O/P EST MOD 30 MIN: CPT

## 2021-09-07 RX ORDER — CELECOXIB 200 MG/1
200 CAPSULE ORAL DAILY
Qty: 30 | Refills: 1 | Status: DISCONTINUED | COMMUNITY
Start: 2021-04-21 | End: 2021-09-07

## 2021-09-07 NOTE — REVIEW OF SYSTEMS
[Negative] : Musculoskeletal [Fever] : no fever [Chills] : no chills [Fatigue] : no fatigue [Chest Pain] : no chest pain [Lower Ext Edema] : no lower extremity edema [Shortness Of Breath] : no shortness of breath [Wheezing] : no wheezing [Cough] : no cough [Dysuria] : no dysuria [Hematuria] : no hematuria [Joint Pain] : no joint pain [Muscle Pain] : no muscle pain

## 2021-09-07 NOTE — HISTORY OF PRESENT ILLNESS
[No Pertinent Cardiac History] : no history of aortic stenosis, atrial fibrillation, coronary artery disease, recent myocardial infarction, or implantable device/pacemaker [No Pertinent Pulmonary History] : no history of asthma, COPD, sleep apnea, or smoking [No Adverse Anesthesia Reaction] : no adverse anesthesia reaction in self or family member [Aortic Stenosis] : no aortic stenosis [Atrial Fibrillation] : no atrial fibrillation [Coronary Artery Disease] : no coronary artery disease [Recent Myocardial Infarction] : no recent myocardial infarction [Implantable Device/Pacemaker] : no implantable device/pacemaker [Asthma] : no asthma [COPD] : no COPD [Sleep Apnea] : no sleep apnea [Smoker] : not a smoker [Family Member] : no family member with adverse anesthesia reaction/sudden death [Self] : no previous adverse anesthesia reaction [Chronic Anticoagulation] : no chronic anticoagulation [Chronic Kidney Disease] : no chronic kidney disease [Diabetes] : no diabetes [(Patient denies any chest pain, claudication, dyspnea on exertion, orthopnea, palpitations or syncope)] : Patient denies any chest pain, claudication, dyspnea on exertion, orthopnea, palpitations or syncope [Moderate (4-6 METs)] : Moderate (4-6 METs) [FreeTextEntry1] : Hernia repair [FreeTextEntry2] : 09/15/21 [FreeTextEntry3] : Dr. Brannon Chavez [FreeTextEntry4] : Ms. COOPER WALDRON is a 65 year old woman former smoker with pmhx of HTN, mild mitral regurgitation who presents for medical clearance. She endorses being in good health and good mood. She is retired. She exercise occasional walking, and denied any dyspnea, chest pain, lightheadedness, nor wheezing with activity. Patient has had surgery in the past, they denied any adverse reaction to anesthesia, nor any bleed and clotting. She is not taking NSAIDs. \par \par Labs anr report to be faxed to [phone] and [phone].

## 2021-09-07 NOTE — ASSESSMENT
[High Risk Surgery - Intraperitoneal, Intrathoracic or Supringuinal Vascular Procedures] : High Risk Surgery - Intraperitoneal, Intrathoracic or Supringuinal Vascular Procedures - Yes (1) [Ischemic Heart Disease] : Ischemic Heart Disease - No (0) [Congestive Heart Failure] : Congestive Heart Failure - No (0) [Prior Cerebrovascular Accident or TIA] : Prior Cerebrovascular Accident or TIA - No (0) [Creatinine >= 2mg/dL (1 Point)] : Creatinine >= 2mg/dL - No (0) [Insulin-dependent Diabetic (1 Point)] : Insulin-dependent Diabetic - No (0) [0] : 0 , RCRI Class: I, Risk of Post-Op Cardiac Complications: 3.9%, 95% CI for Risk Estimate: 2.8% - 5.4% [Patient Optimized for Surgery] : Patient optimized for surgery [No Further Testing Recommended] : no further testing recommended [As per surgery] : as per surgery [FreeTextEntry4] : Patient is considered low risk for low risk procedure. Her chronic medical problems are stable. Patient reminded to avoid NSAIDs for 7 days prior to surgery.  She can use Tylenol as needed for pain.

## 2021-09-08 LAB
ANION GAP SERPL CALC-SCNC: 12 MMOL/L
BASOPHILS # BLD AUTO: 0.05 K/UL
BASOPHILS NFR BLD AUTO: 0.8 %
BUN SERPL-MCNC: 18 MG/DL
CALCIUM SERPL-MCNC: 9.8 MG/DL
CHLORIDE SERPL-SCNC: 105 MMOL/L
CO2 SERPL-SCNC: 24 MMOL/L
CREAT SERPL-MCNC: 0.72 MG/DL
EOSINOPHIL # BLD AUTO: 0.08 K/UL
EOSINOPHIL NFR BLD AUTO: 1.3 %
GLUCOSE SERPL-MCNC: 92 MG/DL
HCT VFR BLD CALC: 40 %
HGB BLD-MCNC: 12.2 G/DL
IMM GRANULOCYTES NFR BLD AUTO: 0.2 %
LYMPHOCYTES # BLD AUTO: 1.54 K/UL
LYMPHOCYTES NFR BLD AUTO: 26 %
MAN DIFF?: NORMAL
MCHC RBC-ENTMCNC: 28.7 PG
MCHC RBC-ENTMCNC: 30.5 GM/DL
MCV RBC AUTO: 94.1 FL
MONOCYTES # BLD AUTO: 0.35 K/UL
MONOCYTES NFR BLD AUTO: 5.9 %
NEUTROPHILS # BLD AUTO: 3.9 K/UL
NEUTROPHILS NFR BLD AUTO: 65.8 %
PLATELET # BLD AUTO: 250 K/UL
POTASSIUM SERPL-SCNC: 5.1 MMOL/L
RBC # BLD: 4.25 M/UL
RBC # FLD: 15.3 %
SODIUM SERPL-SCNC: 141 MMOL/L
WBC # FLD AUTO: 5.93 K/UL

## 2021-09-15 ENCOUNTER — RESULT REVIEW (OUTPATIENT)
Age: 66
End: 2021-09-15

## 2021-11-01 ENCOUNTER — NON-APPOINTMENT (OUTPATIENT)
Age: 66
End: 2021-11-01

## 2021-11-18 ENCOUNTER — OUTPATIENT (OUTPATIENT)
Dept: OUTPATIENT SERVICES | Facility: HOSPITAL | Age: 66
LOS: 1 days | End: 2021-11-18
Payer: MEDICARE

## 2021-11-18 ENCOUNTER — APPOINTMENT (OUTPATIENT)
Dept: ULTRASOUND IMAGING | Facility: CLINIC | Age: 66
End: 2021-11-18
Payer: MEDICARE

## 2021-11-18 DIAGNOSIS — Z90.49 ACQUIRED ABSENCE OF OTHER SPECIFIED PARTS OF DIGESTIVE TRACT: Chronic | ICD-10-CM

## 2021-11-18 DIAGNOSIS — N85.8 OTHER SPECIFIED NONINFLAMMATORY DISORDERS OF UTERUS: Chronic | ICD-10-CM

## 2021-11-18 DIAGNOSIS — R09.89 OTHER SPECIFIED SYMPTOMS AND SIGNS INVOLVING THE CIRCULATORY AND RESPIRATORY SYSTEMS: ICD-10-CM

## 2021-11-18 PROCEDURE — 93880 EXTRACRANIAL BILAT STUDY: CPT

## 2021-11-18 PROCEDURE — 93880 EXTRACRANIAL BILAT STUDY: CPT | Mod: 26

## 2021-11-19 ENCOUNTER — NON-APPOINTMENT (OUTPATIENT)
Age: 66
End: 2021-11-19

## 2021-12-13 ENCOUNTER — RX RENEWAL (OUTPATIENT)
Age: 66
End: 2021-12-13

## 2022-01-01 NOTE — H&P PST ADULT - NSANTHOSAYNRD_GEN_A_CORE
Yes
No. DAMIEN screening performed.  STOP BANG Legend: 0-2 = LOW Risk; 3-4 = INTERMEDIATE Risk; 5-8 = HIGH Risk

## 2022-02-04 ENCOUNTER — APPOINTMENT (OUTPATIENT)
Dept: INTERNAL MEDICINE | Facility: CLINIC | Age: 67
End: 2022-02-04
Payer: MEDICARE

## 2022-02-04 ENCOUNTER — NON-APPOINTMENT (OUTPATIENT)
Age: 67
End: 2022-02-04

## 2022-02-04 VITALS
WEIGHT: 179 LBS | DIASTOLIC BLOOD PRESSURE: 77 MMHG | HEIGHT: 64 IN | TEMPERATURE: 97.6 F | HEART RATE: 72 BPM | SYSTOLIC BLOOD PRESSURE: 122 MMHG | BODY MASS INDEX: 30.56 KG/M2 | OXYGEN SATURATION: 98 %

## 2022-02-04 DIAGNOSIS — Z01.818 ENCOUNTER FOR OTHER PREPROCEDURAL EXAMINATION: ICD-10-CM

## 2022-02-04 DIAGNOSIS — K21.9 GASTRO-ESOPHAGEAL REFLUX DISEASE W/OUT ESOPHAGITIS: ICD-10-CM

## 2022-02-04 PROCEDURE — 93000 ELECTROCARDIOGRAM COMPLETE: CPT | Mod: 59

## 2022-02-04 PROCEDURE — 99214 OFFICE O/P EST MOD 30 MIN: CPT | Mod: 25

## 2022-02-04 NOTE — HISTORY OF PRESENT ILLNESS
[No Pertinent Cardiac History] : no history of aortic stenosis, atrial fibrillation, coronary artery disease, recent myocardial infarction, or implantable device/pacemaker [No Pertinent Pulmonary History] : no history of asthma, COPD, sleep apnea, or smoking [No Adverse Anesthesia Reaction] : no adverse anesthesia reaction in self or family member [Chronic Anticoagulation] : no chronic anticoagulation [Chronic Kidney Disease] : no chronic kidney disease [Diabetes] : no diabetes [FreeTextEntry1] : Left cataract removal [FreeTextEntry2] : 2/15/22 [FreeTextEntry3] : Dr Dewayne Almeida, fax  [FreeTextEntry4] : COOPER WALDRON is a 67 yo woman with hypertension, GERD here for a POC prior to left cataract removal and lens implantation.  Medical problems stable on current meds.\par \par The patient is  with 2 children.  She is a retired  from [employer].  She walks 2 miles daily without difficulty.  Did have Pfizer booster.

## 2022-02-04 NOTE — ASSESSMENT
[Patient Optimized for Surgery] : Patient optimized for surgery [As per surgery] : as per surgery [FreeTextEntry4] : COOPER WALDRON is a 67 yo woman with hypertension, GERD here for a POC prior to left cataract removal and lens implantation.  The patient has a stable EKG and good exercise tolerance.  There are no contraindications to proceeding with the planned procedure.  The patient is medically optimized.  The patient was advised to avoid NSAIDs for 7 days prior to the procedure.\par

## 2022-03-03 ENCOUNTER — LABORATORY RESULT (OUTPATIENT)
Age: 67
End: 2022-03-03

## 2022-03-06 ENCOUNTER — NON-APPOINTMENT (OUTPATIENT)
Age: 67
End: 2022-03-06

## 2022-03-07 ENCOUNTER — APPOINTMENT (OUTPATIENT)
Dept: INTERNAL MEDICINE | Facility: CLINIC | Age: 67
End: 2022-03-07
Payer: MEDICARE

## 2022-03-07 VITALS
WEIGHT: 174 LBS | HEIGHT: 64 IN | OXYGEN SATURATION: 98 % | BODY MASS INDEX: 29.71 KG/M2 | SYSTOLIC BLOOD PRESSURE: 127 MMHG | TEMPERATURE: 98.7 F | HEART RATE: 77 BPM | DIASTOLIC BLOOD PRESSURE: 82 MMHG

## 2022-03-07 DIAGNOSIS — Z86.79 PERSONAL HISTORY OF OTHER DISEASES OF THE CIRCULATORY SYSTEM: ICD-10-CM

## 2022-03-07 DIAGNOSIS — Z86.39 PERSONAL HISTORY OF OTHER ENDOCRINE, NUTRITIONAL AND METABOLIC DISEASE: ICD-10-CM

## 2022-03-07 DIAGNOSIS — Z12.39 ENCOUNTER FOR OTHER SCREENING FOR MALIGNANT NEOPLASM OF BREAST: ICD-10-CM

## 2022-03-07 DIAGNOSIS — M25.551 PAIN IN RIGHT HIP: ICD-10-CM

## 2022-03-07 DIAGNOSIS — M19.012 PRIMARY OSTEOARTHRITIS, LEFT SHOULDER: ICD-10-CM

## 2022-03-07 DIAGNOSIS — E78.1 PURE HYPERGLYCERIDEMIA: ICD-10-CM

## 2022-03-07 DIAGNOSIS — Z12.11 ENCOUNTER FOR SCREENING FOR MALIGNANT NEOPLASM OF COLON: ICD-10-CM

## 2022-03-07 DIAGNOSIS — M87.051 IDIOPATHIC ASEPTIC NECROSIS OF RIGHT FEMUR: ICD-10-CM

## 2022-03-07 DIAGNOSIS — M79.18 MYALGIA, OTHER SITE: ICD-10-CM

## 2022-03-07 DIAGNOSIS — M25.561 PAIN IN RIGHT KNEE: ICD-10-CM

## 2022-03-07 PROCEDURE — G0444 DEPRESSION SCREEN ANNUAL: CPT | Mod: 59

## 2022-03-07 PROCEDURE — G0439: CPT

## 2022-03-07 NOTE — ASSESSMENT
[FreeTextEntry1] : HCM\par Labs discussed with pt today\par Gyn scheduled\par Pt plans on having mammogram done soon\par Bone density utd\par Colonoscopy utd 2020 due 2025\par Derm advised\par Pt will consider pneumovax and shingrix\par continue current meds\par BP check in 6 months

## 2022-03-07 NOTE — HISTORY OF PRESENT ILLNESS
[FreeTextEntry1] : Annual Physical [de-identified] : COOPER WALDRON is a 65 yo woman with hypertension, GERD here for a physical. She has been well overall. Recovering well from left cataract surgery.   Medical problems stable on current meds.\par \par Gyn scheduled.  Mammogram due.  Colonoscopy utd 2020 due 2025. Derm due. Pt will consider shingrix and pneumovax\par \par The patient is  with 2 children. She is a retired  from [employer]. She walks 2 miles daily without difficulty. Did have Pfizer booster.

## 2022-03-07 NOTE — PHYSICAL EXAM
[No Acute Distress] : no acute distress [Well Nourished] : well nourished [Well Developed] : well developed [Well-Appearing] : well-appearing [Normal Sclera/Conjunctiva] : normal sclera/conjunctiva [EOMI] : extraocular movements intact [Normal Outer Ear/Nose] : the outer ears and nose were normal in appearance [Normal Oropharynx] : the oropharynx was normal [Normal TMs] : both tympanic membranes were normal [Normal Nasal Mucosa] : the nasal mucosa was normal [No Lymphadenopathy] : no lymphadenopathy [Supple] : supple [Thyroid Normal, No Nodules] : the thyroid was normal and there were no nodules present [No Accessory Muscle Use] : no accessory muscle use [Clear to Auscultation] : lungs were clear to auscultation bilaterally [Normal Percussion] : the chest was normal to percussion [Normal Rate] : normal rate  [Regular Rhythm] : with a regular rhythm [Normal S1, S2] : normal S1 and S2 [No Murmur] : no murmur heard [No Edema] : there was no peripheral edema [Normal Appearance] : normal in appearance [No Masses] : no palpable masses [No Nipple Discharge] : no nipple discharge [No Axillary Lymphadenopathy] : no axillary lymphadenopathy [Soft] : abdomen soft [Non Tender] : non-tender [Non-distended] : non-distended [Normal Bowel Sounds] : normal bowel sounds [Normal Supraclavicular Nodes] : no supraclavicular lymphadenopathy [Normal Posterior Cervical Nodes] : no posterior cervical lymphadenopathy [Normal Anterior Cervical Nodes] : no anterior cervical lymphadenopathy [No CVA Tenderness] : no CVA  tenderness [Coordination Grossly Intact] : coordination grossly intact [No Focal Deficits] : no focal deficits [Normal Gait] : normal gait [Deep Tendon Reflexes (DTR)] : deep tendon reflexes were 2+ and symmetric [Speech Grossly Normal] : speech grossly normal [Memory Grossly Normal] : memory grossly normal [Normal Affect] : the affect was normal [Alert and Oriented x3] : oriented to person, place, and time [Normal Mood] : the mood was normal [Normal Insight/Judgement] : insight and judgment were intact

## 2022-03-07 NOTE — HEALTH RISK ASSESSMENT
[Good] : ~his/her~  mood as  good [Never] : Never [No] : In the past 12 months have you used drugs other than those required for medical reasons? No [No falls in past year] : Patient reported no falls in the past year [de-identified] : active [de-identified] : regular [None] : None [] :  [Feels Safe at Home] : Feels safe at home [Fully functional (bathing, dressing, toileting, transferring, walking, feeding)] : Fully functional (bathing, dressing, toileting, transferring, walking, feeding) [Fully functional (using the telephone, shopping, preparing meals, housekeeping, doing laundry, using] : Fully functional and needs no help or supervision to perform IADLs (using the telephone, shopping, preparing meals, housekeeping, doing laundry, using transportation, managing medications and managing finances) [Reports changes in hearing] : Reports no changes in hearing [Reports changes in vision] : Reports no changes in vision [Reports changes in dental health] : Reports no changes in dental health [Smoke Detector] : smoke detector [Carbon Monoxide Detector] : carbon monoxide detector [Seat Belt] :  uses seat belt

## 2022-03-10 ENCOUNTER — APPOINTMENT (OUTPATIENT)
Dept: OBGYN | Facility: CLINIC | Age: 67
End: 2022-03-10
Payer: MEDICARE

## 2022-03-10 VITALS
HEIGHT: 64 IN | BODY MASS INDEX: 29.71 KG/M2 | DIASTOLIC BLOOD PRESSURE: 85 MMHG | WEIGHT: 174 LBS | SYSTOLIC BLOOD PRESSURE: 140 MMHG

## 2022-03-10 DIAGNOSIS — M85.80 OTHER SPECIFIED DISORDERS OF BONE DENSITY AND STRUCTURE, UNSPECIFIED SITE: ICD-10-CM

## 2022-03-10 PROCEDURE — G0328 FECAL BLOOD SCRN IMMUNOASSAY: CPT | Mod: GA,QW

## 2022-03-10 PROCEDURE — G0101: CPT | Mod: GA

## 2022-06-03 ENCOUNTER — APPOINTMENT (OUTPATIENT)
Dept: MAMMOGRAPHY | Facility: CLINIC | Age: 67
End: 2022-06-03
Payer: MEDICARE

## 2022-06-03 ENCOUNTER — OUTPATIENT (OUTPATIENT)
Dept: OUTPATIENT SERVICES | Facility: HOSPITAL | Age: 67
LOS: 1 days | End: 2022-06-03
Payer: MEDICARE

## 2022-06-03 ENCOUNTER — RESULT REVIEW (OUTPATIENT)
Age: 67
End: 2022-06-03

## 2022-06-03 DIAGNOSIS — N85.8 OTHER SPECIFIED NONINFLAMMATORY DISORDERS OF UTERUS: Chronic | ICD-10-CM

## 2022-06-03 DIAGNOSIS — Z00.00 ENCOUNTER FOR GENERAL ADULT MEDICAL EXAMINATION WITHOUT ABNORMAL FINDINGS: ICD-10-CM

## 2022-06-03 DIAGNOSIS — Z90.49 ACQUIRED ABSENCE OF OTHER SPECIFIED PARTS OF DIGESTIVE TRACT: Chronic | ICD-10-CM

## 2022-06-03 PROCEDURE — 77067 SCR MAMMO BI INCL CAD: CPT | Mod: 26

## 2022-06-03 PROCEDURE — 77063 BREAST TOMOSYNTHESIS BI: CPT | Mod: 26

## 2022-06-03 PROCEDURE — 77063 BREAST TOMOSYNTHESIS BI: CPT

## 2022-06-03 PROCEDURE — 77067 SCR MAMMO BI INCL CAD: CPT

## 2022-10-31 ENCOUNTER — NON-APPOINTMENT (OUTPATIENT)
Age: 67
End: 2022-10-31

## 2022-11-01 ENCOUNTER — APPOINTMENT (OUTPATIENT)
Dept: INTERNAL MEDICINE | Facility: CLINIC | Age: 67
End: 2022-11-01

## 2022-11-01 VITALS
SYSTOLIC BLOOD PRESSURE: 122 MMHG | BODY MASS INDEX: 29.53 KG/M2 | HEART RATE: 98 BPM | RESPIRATION RATE: 16 BRPM | WEIGHT: 173 LBS | HEIGHT: 64 IN | OXYGEN SATURATION: 78 % | DIASTOLIC BLOOD PRESSURE: 70 MMHG | TEMPERATURE: 97.8 F

## 2022-11-01 DIAGNOSIS — M25.50 PAIN IN UNSPECIFIED JOINT: ICD-10-CM

## 2022-11-01 PROCEDURE — 99214 OFFICE O/P EST MOD 30 MIN: CPT | Mod: 25

## 2022-11-01 PROCEDURE — 36415 COLL VENOUS BLD VENIPUNCTURE: CPT

## 2022-11-01 NOTE — HISTORY OF PRESENT ILLNESS
[FreeTextEntry1] : fuv [de-identified] : COOPER WALDRON is a 65 yo woman with hypertension, GERD here for a fuv to discuss fatigue and virgilio hip pain for the past few weeks. She has been well overall.   Medical problems stable on current meds.\par \par Had flu shot and bivalent booster on 9/25/22\par \par The patient is  with 2 children. She is a retired  from [employer]. She swims without difficulty.

## 2022-11-03 LAB
25(OH)D3 SERPL-MCNC: 58 NG/ML
ALBUMIN SERPL ELPH-MCNC: 4.6 G/DL
ALP BLD-CCNC: 94 U/L
ALT SERPL-CCNC: 16 U/L
ANION GAP SERPL CALC-SCNC: 13 MMOL/L
AST SERPL-CCNC: 18 U/L
BASOPHILS # BLD AUTO: 0.04 K/UL
BASOPHILS NFR BLD AUTO: 0.6 %
BILIRUB SERPL-MCNC: 0.2 MG/DL
BUN SERPL-MCNC: 20 MG/DL
CALCIUM SERPL-MCNC: 9.9 MG/DL
CHLORIDE SERPL-SCNC: 102 MMOL/L
CO2 SERPL-SCNC: 25 MMOL/L
CREAT SERPL-MCNC: 0.9 MG/DL
EGFR: 70 ML/MIN/1.73M2
EOSINOPHIL # BLD AUTO: 0.11 K/UL
EOSINOPHIL NFR BLD AUTO: 1.7 %
ERYTHROCYTE [SEDIMENTATION RATE] IN BLOOD BY WESTERGREN METHOD: 43 MM/HR
ESTIMATED AVERAGE GLUCOSE: 103 MG/DL
GLUCOSE SERPL-MCNC: 100 MG/DL
HBA1C MFR BLD HPLC: 5.2 %
HCT VFR BLD CALC: 37.4 %
HGB BLD-MCNC: 11.8 G/DL
IMM GRANULOCYTES NFR BLD AUTO: 0.2 %
LYMPHOCYTES # BLD AUTO: 1.71 K/UL
LYMPHOCYTES NFR BLD AUTO: 26.8 %
MAN DIFF?: NORMAL
MCHC RBC-ENTMCNC: 28.8 PG
MCHC RBC-ENTMCNC: 31.6 GM/DL
MCV RBC AUTO: 91.2 FL
MONOCYTES # BLD AUTO: 0.46 K/UL
MONOCYTES NFR BLD AUTO: 7.2 %
NEUTROPHILS # BLD AUTO: 4.06 K/UL
NEUTROPHILS NFR BLD AUTO: 63.5 %
PLATELET # BLD AUTO: 261 K/UL
POTASSIUM SERPL-SCNC: 5.2 MMOL/L
PROT SERPL-MCNC: 7.2 G/DL
RBC # BLD: 4.1 M/UL
RBC # FLD: 14.6 %
SODIUM SERPL-SCNC: 141 MMOL/L
T4 FREE SERPL-MCNC: 1.1 NG/DL
TSH SERPL-ACNC: 2.14 UIU/ML
VIT B12 SERPL-MCNC: 286 PG/ML
WBC # FLD AUTO: 6.39 K/UL

## 2022-11-12 ENCOUNTER — NON-APPOINTMENT (OUTPATIENT)
Age: 67
End: 2022-11-12

## 2022-11-15 ENCOUNTER — APPOINTMENT (OUTPATIENT)
Dept: RADIOLOGY | Facility: CLINIC | Age: 67
End: 2022-11-15

## 2022-11-15 ENCOUNTER — APPOINTMENT (OUTPATIENT)
Dept: RHEUMATOLOGY | Facility: CLINIC | Age: 67
End: 2022-11-15

## 2022-11-15 ENCOUNTER — LABORATORY RESULT (OUTPATIENT)
Age: 67
End: 2022-11-15

## 2022-11-15 VITALS
HEIGHT: 64 IN | HEART RATE: 67 BPM | OXYGEN SATURATION: 96 % | DIASTOLIC BLOOD PRESSURE: 84 MMHG | SYSTOLIC BLOOD PRESSURE: 153 MMHG | WEIGHT: 168 LBS | BODY MASS INDEX: 28.68 KG/M2 | TEMPERATURE: 97.6 F

## 2022-11-15 DIAGNOSIS — M25.552 PAIN IN RIGHT HIP: ICD-10-CM

## 2022-11-15 DIAGNOSIS — M54.50 LOW BACK PAIN, UNSPECIFIED: ICD-10-CM

## 2022-11-15 DIAGNOSIS — M25.551 PAIN IN RIGHT HIP: ICD-10-CM

## 2022-11-15 PROCEDURE — 73522 X-RAY EXAM HIPS BI 3-4 VIEWS: CPT

## 2022-11-15 PROCEDURE — 99205 OFFICE O/P NEW HI 60 MIN: CPT

## 2022-11-15 PROCEDURE — 72110 X-RAY EXAM L-2 SPINE 4/>VWS: CPT

## 2022-11-17 ENCOUNTER — TRANSCRIPTION ENCOUNTER (OUTPATIENT)
Age: 67
End: 2022-11-17

## 2022-11-17 LAB
CCP AB SER IA-ACNC: <8 UNITS
CRP SERPL-MCNC: <3 MG/L
DEPRECATED KAPPA LC FREE/LAMBDA SER: 1.71 RATIO
ERYTHROCYTE [SEDIMENTATION RATE] IN BLOOD BY WESTERGREN METHOD: 25 MM/HR
IGA SER QL IEP: 384 MG/DL
IGG SER QL IEP: 915 MG/DL
IGM SER QL IEP: 237 MG/DL
KAPPA LC CSF-MCNC: 1.24 MG/DL
KAPPA LC SERPL-MCNC: 2.12 MG/DL
RF+CCP IGG SER-IMP: NEGATIVE
RHEUMATOID FACT SER QL: 10 IU/ML

## 2022-11-23 LAB
ALBUMIN MFR SERPL ELPH: 59 %
ALBUMIN SERPL-MCNC: 4.7 G/DL
ALBUMIN/GLOB SERPL: 1.5 RATIO
ALPHA1 GLOB MFR SERPL ELPH: 4.5 %
ALPHA1 GLOB SERPL ELPH-MCNC: 0.4 G/DL
ALPHA2 GLOB MFR SERPL ELPH: 9.4 %
ALPHA2 GLOB SERPL ELPH-MCNC: 0.7 G/DL
B-GLOBULIN MFR SERPL ELPH: 14.8 %
B-GLOBULIN SERPL ELPH-MCNC: 1.2 G/DL
GAMMA GLOB FLD ELPH-MCNC: 1 G/DL
GAMMA GLOB MFR SERPL ELPH: 12.3 %
INTERPRETATION SERPL IEP-IMP: NORMAL
M PROTEIN SPEC IFE-MCNC: NORMAL
PROT SERPL-MCNC: 7.9 G/DL
PROT SERPL-MCNC: 7.9 G/DL

## 2022-12-06 LAB
ALBUPE: 14.9 %
ALPHA1UPE: 43.1 %
ALPHA2UPE: 21.1 %
BETAUPE: 14.8 %
CREAT 24H UR-MCNC: NORMAL G/24 H
CREATININE UR (MAYO): 154 MG/DL
GAMMAUPE: 6.1 %
IGA 24H UR QL IFE: NORMAL
KAPPA LC 24H UR QL: NORMAL
PROT PATTERN 24H UR ELPH-IMP: NORMAL
PROT UR-MCNC: 15 MG/DL
PROT UR-MCNC: 15 MG/DL
SPECIMEN VOL 24H UR: NORMAL ML

## 2023-02-25 ENCOUNTER — NON-APPOINTMENT (OUTPATIENT)
Age: 68
End: 2023-02-25

## 2023-03-13 ENCOUNTER — RX RENEWAL (OUTPATIENT)
Age: 68
End: 2023-03-13

## 2023-03-25 ENCOUNTER — NON-APPOINTMENT (OUTPATIENT)
Age: 68
End: 2023-03-25

## 2023-04-12 ENCOUNTER — NON-APPOINTMENT (OUTPATIENT)
Age: 68
End: 2023-04-12

## 2023-04-12 ENCOUNTER — APPOINTMENT (OUTPATIENT)
Dept: DERMATOLOGY | Facility: CLINIC | Age: 68
End: 2023-04-12
Payer: MEDICARE

## 2023-04-12 DIAGNOSIS — D18.01 HEMANGIOMA OF SKIN AND SUBCUTANEOUS TISSUE: ICD-10-CM

## 2023-04-12 DIAGNOSIS — L92.0 GRANULOMA ANNULARE: ICD-10-CM

## 2023-04-12 PROCEDURE — 99203 OFFICE O/P NEW LOW 30 MIN: CPT

## 2023-04-13 PROBLEM — L92.0 GRANULOMA ANNULARE: Status: ACTIVE | Noted: 2023-04-13

## 2023-04-13 PROBLEM — D18.01 ANGIOMA OF SKIN: Status: ACTIVE | Noted: 2023-04-13

## 2023-05-11 LAB
25(OH)D3 SERPL-MCNC: 47.9 NG/ML
ALBUMIN SERPL ELPH-MCNC: 4.6 G/DL
ALP BLD-CCNC: 80 U/L
ALT SERPL-CCNC: 16 U/L
ANION GAP SERPL CALC-SCNC: 11 MMOL/L
APPEARANCE: CLEAR
AST SERPL-CCNC: 22 U/L
BASOPHILS # BLD AUTO: 0.04 K/UL
BASOPHILS NFR BLD AUTO: 0.7 %
BILIRUB SERPL-MCNC: 0.3 MG/DL
BILIRUBIN URINE: NEGATIVE
BLOOD URINE: NEGATIVE
BUN SERPL-MCNC: 16 MG/DL
CALCIUM SERPL-MCNC: 10 MG/DL
CHLORIDE SERPL-SCNC: 105 MMOL/L
CHOLEST SERPL-MCNC: 221 MG/DL
CO2 SERPL-SCNC: 27 MMOL/L
COLOR: YELLOW
CREAT SERPL-MCNC: 0.71 MG/DL
EGFR: 93 ML/MIN/1.73M2
EOSINOPHIL # BLD AUTO: 0.15 K/UL
EOSINOPHIL NFR BLD AUTO: 2.6 %
ESTIMATED AVERAGE GLUCOSE: 111 MG/DL
GLUCOSE QUALITATIVE U: NEGATIVE MG/DL
GLUCOSE SERPL-MCNC: 90 MG/DL
HBA1C MFR BLD HPLC: 5.5 %
HCT VFR BLD CALC: 36.7 %
HDLC SERPL-MCNC: 91 MG/DL
HGB BLD-MCNC: 11.6 G/DL
IMM GRANULOCYTES NFR BLD AUTO: 0.3 %
KETONES URINE: NEGATIVE MG/DL
LDLC SERPL CALC-MCNC: 107 MG/DL
LEUKOCYTE ESTERASE URINE: NEGATIVE
LYMPHOCYTES # BLD AUTO: 1.86 K/UL
LYMPHOCYTES NFR BLD AUTO: 32.2 %
MAN DIFF?: NORMAL
MCHC RBC-ENTMCNC: 28.8 PG
MCHC RBC-ENTMCNC: 31.6 GM/DL
MCV RBC AUTO: 91.1 FL
MONOCYTES # BLD AUTO: 0.4 K/UL
MONOCYTES NFR BLD AUTO: 6.9 %
NEUTROPHILS # BLD AUTO: 3.31 K/UL
NEUTROPHILS NFR BLD AUTO: 57.3 %
NITRITE URINE: NEGATIVE
NONHDLC SERPL-MCNC: 130 MG/DL
PH URINE: 6.5
PLATELET # BLD AUTO: 244 K/UL
POTASSIUM SERPL-SCNC: 4 MMOL/L
PROT SERPL-MCNC: 7.1 G/DL
PROTEIN URINE: NEGATIVE MG/DL
RBC # BLD: 4.03 M/UL
RBC # FLD: 14.7 %
SODIUM SERPL-SCNC: 143 MMOL/L
SPECIFIC GRAVITY URINE: 1.01
T4 FREE SERPL-MCNC: 1 NG/DL
TRIGL SERPL-MCNC: 116 MG/DL
TSH SERPL-ACNC: 2.69 UIU/ML
UROBILINOGEN URINE: 0.2 MG/DL
VIT B12 SERPL-MCNC: 799 PG/ML
WBC # FLD AUTO: 5.78 K/UL

## 2023-05-16 ENCOUNTER — NON-APPOINTMENT (OUTPATIENT)
Age: 68
End: 2023-05-16

## 2023-05-16 ENCOUNTER — APPOINTMENT (OUTPATIENT)
Dept: INTERNAL MEDICINE | Facility: CLINIC | Age: 68
End: 2023-05-16
Payer: MEDICARE

## 2023-05-16 VITALS
OXYGEN SATURATION: 98 % | SYSTOLIC BLOOD PRESSURE: 132 MMHG | TEMPERATURE: 97.2 F | WEIGHT: 173 LBS | RESPIRATION RATE: 16 BRPM | HEIGHT: 64 IN | HEART RATE: 70 BPM | DIASTOLIC BLOOD PRESSURE: 74 MMHG | BODY MASS INDEX: 29.53 KG/M2

## 2023-05-16 PROCEDURE — G0439: CPT

## 2023-05-16 PROCEDURE — 93000 ELECTROCARDIOGRAM COMPLETE: CPT

## 2023-05-16 NOTE — ASSESSMENT
[FreeTextEntry1] : HCM\par Labs discussed with pt today\par low chol diet\par repeat lipids fasting in 6 weeks\par Gyn utd\par mammogram due later this year\par Bone density utd\par Colonoscopy utd 2020 due 2025\par Derm utd\par Pt will consider bivalent booster\par continue current meds\par BP check in 6 months

## 2023-05-16 NOTE — PHYSICAL EXAM
[No Acute Distress] : no acute distress [Well Nourished] : well nourished [Well Developed] : well developed [Well-Appearing] : well-appearing [Normal Sclera/Conjunctiva] : normal sclera/conjunctiva [EOMI] : extraocular movements intact [Normal Outer Ear/Nose] : the outer ears and nose were normal in appearance [Normal Oropharynx] : the oropharynx was normal [Normal TMs] : both tympanic membranes were normal [Normal Nasal Mucosa] : the nasal mucosa was normal [No Lymphadenopathy] : no lymphadenopathy [Supple] : supple [Thyroid Normal, No Nodules] : the thyroid was normal and there were no nodules present [No Accessory Muscle Use] : no accessory muscle use [Clear to Auscultation] : lungs were clear to auscultation bilaterally [Normal Percussion] : the chest was normal to percussion [Normal Rate] : normal rate  [Regular Rhythm] : with a regular rhythm [Normal S1, S2] : normal S1 and S2 [No Murmur] : no murmur heard [No Edema] : there was no peripheral edema [Normal Appearance] : normal in appearance [No Masses] : no palpable masses [No Nipple Discharge] : no nipple discharge [No Axillary Lymphadenopathy] : no axillary lymphadenopathy [Soft] : abdomen soft [Non Tender] : non-tender [Non-distended] : non-distended [Normal Bowel Sounds] : normal bowel sounds [No CVA Tenderness] : no CVA  tenderness [Coordination Grossly Intact] : coordination grossly intact [No Focal Deficits] : no focal deficits [Normal Gait] : normal gait [Deep Tendon Reflexes (DTR)] : deep tendon reflexes were 2+ and symmetric [Speech Grossly Normal] : speech grossly normal [Memory Grossly Normal] : memory grossly normal [Normal Affect] : the affect was normal [Alert and Oriented x3] : oriented to person, place, and time [Normal Mood] : the mood was normal [Normal Insight/Judgement] : insight and judgment were intact

## 2023-05-16 NOTE — HEALTH RISK ASSESSMENT
[Good] : ~his/her~  mood as  good [No] : In the past 12 months have you used drugs other than those required for medical reasons? No [No falls in past year] : Patient reported no falls in the past year [None] : None [] :  [Feels Safe at Home] : Feels safe at home [Fully functional (bathing, dressing, toileting, transferring, walking, feeding)] : Fully functional (bathing, dressing, toileting, transferring, walking, feeding) [Fully functional (using the telephone, shopping, preparing meals, housekeeping, doing laundry, using] : Fully functional and needs no help or supervision to perform IADLs (using the telephone, shopping, preparing meals, housekeeping, doing laundry, using transportation, managing medications and managing finances) [Smoke Detector] : smoke detector [Carbon Monoxide Detector] : carbon monoxide detector [Seat Belt] :  uses seat belt [de-identified] : active [de-identified] : regular [Reports changes in hearing] : Reports no changes in hearing [Reports changes in vision] : Reports no changes in vision [Reports changes in dental health] : Reports no changes in dental health [Never] : Never

## 2023-05-16 NOTE — HISTORY OF PRESENT ILLNESS
[FreeTextEntry1] : Annual Physical [de-identified] : COOPER WALDRON is a 68 yo woman with hypertension, GERD here for a physical. She has been well overall. Medical problems stable on current meds.\par \par Gyn due next year.  Mammogram due later this year.  Colonoscopy utd 2020 due 2025. Derm utd. Pt will consider bivalent covid 19 booster\par \par The patient is  with 2 children. She is a retired  from [employer]. She swims and walks without difficulty.  Left knee sometimes acting up.

## 2023-07-09 ENCOUNTER — RX RENEWAL (OUTPATIENT)
Age: 68
End: 2023-07-09

## 2023-08-03 ENCOUNTER — RX RENEWAL (OUTPATIENT)
Age: 68
End: 2023-08-03

## 2023-08-04 ENCOUNTER — APPOINTMENT (OUTPATIENT)
Dept: MAMMOGRAPHY | Facility: CLINIC | Age: 68
End: 2023-08-04
Payer: MEDICARE

## 2023-08-04 ENCOUNTER — RESULT REVIEW (OUTPATIENT)
Age: 68
End: 2023-08-04

## 2023-08-04 PROCEDURE — 77067 SCR MAMMO BI INCL CAD: CPT

## 2023-08-04 PROCEDURE — 77063 BREAST TOMOSYNTHESIS BI: CPT

## 2023-10-03 ENCOUNTER — APPOINTMENT (OUTPATIENT)
Dept: ORTHOPEDIC SURGERY | Facility: CLINIC | Age: 68
End: 2023-10-03
Payer: MEDICARE

## 2023-10-03 DIAGNOSIS — G56.01 CARPAL TUNNEL SYNDROME, RIGHT UPPER LIMB: ICD-10-CM

## 2023-10-03 PROCEDURE — 20526 THER INJECTION CARP TUNNEL: CPT | Mod: RT

## 2023-10-03 PROCEDURE — 99204 OFFICE O/P NEW MOD 45 MIN: CPT | Mod: 25

## 2023-10-25 ENCOUNTER — RX RENEWAL (OUTPATIENT)
Age: 68
End: 2023-10-25

## 2023-10-27 ENCOUNTER — TRANSCRIPTION ENCOUNTER (OUTPATIENT)
Age: 68
End: 2023-10-27

## 2023-11-28 ENCOUNTER — TRANSCRIPTION ENCOUNTER (OUTPATIENT)
Age: 68
End: 2023-11-28

## 2023-11-28 LAB
CHOLEST SERPL-MCNC: 205 MG/DL
HDLC SERPL-MCNC: 80 MG/DL
LDLC SERPL CALC-MCNC: 96 MG/DL
NONHDLC SERPL-MCNC: 125 MG/DL
TRIGL SERPL-MCNC: 173 MG/DL
VIT B12 SERPL-MCNC: 1133 PG/ML

## 2024-01-04 ENCOUNTER — TRANSCRIPTION ENCOUNTER (OUTPATIENT)
Age: 69
End: 2024-01-04

## 2024-01-05 ENCOUNTER — RX RENEWAL (OUTPATIENT)
Age: 69
End: 2024-01-05

## 2024-01-05 ENCOUNTER — TRANSCRIPTION ENCOUNTER (OUTPATIENT)
Age: 69
End: 2024-01-05

## 2024-02-18 ENCOUNTER — NON-APPOINTMENT (OUTPATIENT)
Age: 69
End: 2024-02-18

## 2024-03-27 ENCOUNTER — APPOINTMENT (OUTPATIENT)
Dept: MRI IMAGING | Facility: CLINIC | Age: 69
End: 2024-03-27
Payer: MEDICARE

## 2024-03-27 ENCOUNTER — APPOINTMENT (OUTPATIENT)
Dept: ORTHOPEDIC SURGERY | Facility: CLINIC | Age: 69
End: 2024-03-27
Payer: MEDICARE

## 2024-03-27 VITALS
SYSTOLIC BLOOD PRESSURE: 128 MMHG | BODY MASS INDEX: 29.53 KG/M2 | WEIGHT: 173 LBS | HEART RATE: 69 BPM | DIASTOLIC BLOOD PRESSURE: 84 MMHG | HEIGHT: 64 IN

## 2024-03-27 DIAGNOSIS — S83.252A BUCKET-HANDLE TEAR OF LATERAL MENISCUS, CURRENT INJURY, LEFT KNEE, INITIAL ENCOUNTER: ICD-10-CM

## 2024-03-27 PROCEDURE — 73721 MRI JNT OF LWR EXTRE W/O DYE: CPT | Mod: LT

## 2024-03-27 PROCEDURE — 73564 X-RAY EXAM KNEE 4 OR MORE: CPT | Mod: LT

## 2024-03-27 PROCEDURE — 99204 OFFICE O/P NEW MOD 45 MIN: CPT

## 2024-03-27 NOTE — PHYSICAL EXAM
[de-identified] : Gen: NAD Resp: Nonlabored respirations, no SOB Gait: Nl   L Knee: Skin intact No effusion 0-130 AROM Tender LJL 5/5 IP Q EHL TA GS SILT L3-S1 2+ dp pt wwp bcr   1A lachman and (-) pivot shift Grade 1 posterior drawer Stable to v/v at 0 and 30 degrees (-) Dial test at 30, 90 degrees   (+) Caty, Thessaly Lateral joint pain with shallow 2 leg squat   1+ patellar translation (-) pain with patellar compression/grind (-) J sign (-) apprehension  [de-identified] : The following radiographs were ordered and read by me during this patient's visit. I reviewed each radiograph in detail with the patient and discussed the findings as highlighted below.   4 views of the L knee were obtained today that show no fracture, or dislocation. There are mild-moderate degenerative changes seen. There is no malalignment. No obvious osseous abnormality. Otherwise unremarkable.  Skin Substitute Text: The defect edges were debeveled with a #[id number] scalpel blade.  Given the location of the defect, shape of the defect and the proximity to free margins a skin substitute graft was deemed most appropriate.  The graft material was trimmed to fit the size of the defect. The graft was then placed in the primary defect and oriented appropriately.

## 2024-03-27 NOTE — HISTORY OF PRESENT ILLNESS
[de-identified] : 68yF, retired [employer] senior  in [employer], pw L knee pain.  Pt was playing pickleball when they made an aggressive pivot and felt a strain on the inside of their knee.  They have tried NSAIDs and activity modification without significant relief.  They note catching/clicking/locking/buckling with the locking so severe they manually bend/extend the knee until it clicks back into place.  They deny any overt swelling and any numbness/paresthesias.  The mechanical symptoms have been very concerning to them and they wish to understand the diagnosis and discuss possible treatment options.  6/10 medial knee pain without radiation. Referred by her son who is a .  Going on family trip to [facility] in 1m.

## 2024-03-27 NOTE — DISCUSSION/SUMMARY
[de-identified] : 68yF pw L lateral meniscus buckethandle tear in setting of mild OA.  The patient was extensively counseled on treatment options including but not limited to observation, rest/activity modification, bracing, anti-inflammatory medications, physical therapy, injections, and surgery.  The natural history of the disease was thoroughly explained.  We discussed that the majority of the time, this condition can be initially treated conservatively. The patient will proceed with: -MRI L knee -knee brace -diclofenac rx provided - pt instructed to take with meals and not with other nsaid's including advil, aleve, and toradol.  The pt understands to stop taking the medication if any stomach sx develop or they develop any type of bleeding or bruising, at which point they will present to their PMD -pt was instructed on the importance of resting, icing and elevating to minimize swelling -RTC after MRI  I have personally obtained the history, reviewed the ROS as noted, and performed the physical examination today.  The patient and I discussed the assessment and options and developed the plan.  All questions were answered and the patient stated their understanding of the treatment plan and appreciation of the visit.  My cumulative time spent on this patient's visit included: Preparation for the visit, review of the medical records, review of pertinent diagnostic studies, examination and counseling of the patient on the above diagnosis, treatment plan and prognosis, orders of diagnostic tests, medications and/or appropriate procedures and documentation in the medical records of today's visit.   Fredi Tamez MD

## 2024-04-01 ENCOUNTER — RX RENEWAL (OUTPATIENT)
Age: 69
End: 2024-04-01

## 2024-04-10 ENCOUNTER — APPOINTMENT (OUTPATIENT)
Dept: ORTHOPEDIC SURGERY | Facility: CLINIC | Age: 69
End: 2024-04-10
Payer: MEDICARE

## 2024-04-10 DIAGNOSIS — S83.289A OTHER TEAR OF LATERAL MENISCUS, CURRENT INJURY, UNSPECIFIED KNEE, INITIAL ENCOUNTER: ICD-10-CM

## 2024-04-10 PROCEDURE — G2211 COMPLEX E/M VISIT ADD ON: CPT

## 2024-04-10 PROCEDURE — 99214 OFFICE O/P EST MOD 30 MIN: CPT

## 2024-04-10 NOTE — PHYSICAL EXAM
[de-identified] : Gen: NAD Resp: Nonlabored respirations, no SOB Gait: Nl   L Knee: Skin intact No effusion 0-130 AROM Tender LJL 5/5 IP Q EHL TA GS SILT L3-S1 2+ dp pt wwp bcr   1A lachman and (-) pivot shift Grade 1 posterior drawer Stable to v/v at 0 and 30 degrees (-) Dial test at 30, 90 degrees   (+) Caty, Thessaly Lateral joint pain with shallow 2 leg squat   1+ patellar translation (-) pain with patellar compression/grind (-) J sign (-) apprehension  [de-identified] : The following radiographs were ordered and read by me during this patient's visit. I reviewed each radiograph in detail with the patient and discussed the findings as highlighted below.   4 views of the L knee were obtained today that show no fracture, or dislocation. There are mild-moderate degenerative changes seen. There is no malalignment. No obvious osseous abnormality. Otherwise unremarkable.   I independently reviewed and interpreted the MRI of the knee.  I discussed with the patient the MRI demonstrates an effusion and anterior root lateral  meniscus tearing with extrusion.  No complete ligamentous injury.  Small, benign chondral femoral lesion.  Moderate lateral chondral wear.  IMPRESSION: Macerated and completely tearing of the anterior horn and anterior root insertion of the lateral meniscus.  Full-thickness chondral defect of the weightbearing lateral tibial plateau. Fissuring of the posterior weightbearing and nonweightbearing portions of the lateral femoral condyle.  Mild joint effusion with mild synovial thickening.

## 2024-04-10 NOTE — DISCUSSION/SUMMARY
[de-identified] : 68yF pw L lateral meniscus anterior root tear in setting of mild OA.  The patient was extensively counseled on treatment options including but not limited to observation, rest/activity modification, bracing, anti-inflammatory medications, physical therapy, injections, and surgery.  The natural history of the disease was thoroughly explained.   Discussed chondroprotective effect of intact meniscus root, lack of hoop stresses.  Will monitor sx upon return from [place] and discuss potential treatments. The patient will proceed with: -PT -knee brace -diclofenac rx provided - pt instructed to take with meals and not with other nsaid's including advil, aleve, and toradol.  The pt understands to stop taking the medication if any stomach sx develop or they develop any type of bleeding or bruising, at which point they will present to their PMD -pt was instructed on the importance of resting, icing and elevating to minimize swelling -RTC 6w  I have personally obtained the history, reviewed the ROS as noted, and performed the physical examination today.  The patient and I discussed the assessment and options and developed the plan.  All questions were answered and the patient stated their understanding of the treatment plan and appreciation of the visit.  My cumulative time spent on this patient's visit included: Preparation for the visit, review of the medical records, review of pertinent diagnostic studies, examination and counseling of the patient on the above diagnosis, treatment plan and prognosis, orders of diagnostic tests, medications and/or appropriate procedures and documentation in the medical records of today's visit.   Fredi Tamez MD

## 2024-04-10 NOTE — HISTORY OF PRESENT ILLNESS
[de-identified] : 68yF, retired [employer] senior  in [employer], pw L knee pain.  Pt was playing pickleball when they made an aggressive pivot and felt a strain on the inside of their knee.  They have tried NSAIDs and activity modification without significant relief.  They note catching/clicking/locking/buckling with the locking so severe they manually bend/extend the knee until it clicks back into place.  They deny any overt swelling and any numbness/paresthesias.  The mechanical symptoms have been very concerning to them and they wish to understand the diagnosis and discuss possible treatment options.  6/10 medial knee pain without radiation. Referred by her son who is a .  Going on family trip to [facility] in 1m.  4/10 interval - had recent cold and decrease in activity, minimal knee symptoms as has been more stationary.  MRI complete

## 2024-04-15 ENCOUNTER — APPOINTMENT (OUTPATIENT)
Dept: ORTHOPEDIC SURGERY | Facility: CLINIC | Age: 69
End: 2024-04-15

## 2024-05-03 ENCOUNTER — TRANSCRIPTION ENCOUNTER (OUTPATIENT)
Age: 69
End: 2024-05-03

## 2024-05-13 LAB
25(OH)D3 SERPL-MCNC: 45.6 NG/ML
ALBUMIN SERPL ELPH-MCNC: 4.4 G/DL
ALP BLD-CCNC: 95 U/L
ALT SERPL-CCNC: 13 U/L
ANION GAP SERPL CALC-SCNC: 11 MMOL/L
APPEARANCE: CLEAR
AST SERPL-CCNC: 16 U/L
BASOPHILS # BLD AUTO: 0.05 K/UL
BASOPHILS NFR BLD AUTO: 0.8 %
BILIRUB SERPL-MCNC: 0.2 MG/DL
BILIRUBIN URINE: NEGATIVE
BLOOD URINE: NEGATIVE
BUN SERPL-MCNC: 18 MG/DL
CALCIUM SERPL-MCNC: 9.5 MG/DL
CHLORIDE SERPL-SCNC: 106 MMOL/L
CHOLEST SERPL-MCNC: 202 MG/DL
CO2 SERPL-SCNC: 25 MMOL/L
COLOR: YELLOW
CREAT SERPL-MCNC: 0.72 MG/DL
EGFR: 90 ML/MIN/1.73M2
EOSINOPHIL # BLD AUTO: 0.17 K/UL
EOSINOPHIL NFR BLD AUTO: 2.8 %
ESTIMATED AVERAGE GLUCOSE: 111 MG/DL
GLUCOSE QUALITATIVE U: NEGATIVE MG/DL
GLUCOSE SERPL-MCNC: 94 MG/DL
HBA1C MFR BLD HPLC: 5.5 %
HCT VFR BLD CALC: 35.7 %
HDLC SERPL-MCNC: 78 MG/DL
HGB BLD-MCNC: 11 G/DL
IMM GRANULOCYTES NFR BLD AUTO: 0.2 %
KETONES URINE: NEGATIVE MG/DL
LDLC SERPL CALC-MCNC: 104 MG/DL
LEUKOCYTE ESTERASE URINE: NEGATIVE
LYMPHOCYTES # BLD AUTO: 2.19 K/UL
LYMPHOCYTES NFR BLD AUTO: 35.7 %
MAN DIFF?: NORMAL
MCHC RBC-ENTMCNC: 26.6 PG
MCHC RBC-ENTMCNC: 30.8 GM/DL
MCV RBC AUTO: 86.2 FL
MONOCYTES # BLD AUTO: 0.41 K/UL
MONOCYTES NFR BLD AUTO: 6.7 %
NEUTROPHILS # BLD AUTO: 3.3 K/UL
NEUTROPHILS NFR BLD AUTO: 53.8 %
NITRITE URINE: NEGATIVE
NONHDLC SERPL-MCNC: 124 MG/DL
PH URINE: 6
PLATELET # BLD AUTO: 276 K/UL
POTASSIUM SERPL-SCNC: 4.5 MMOL/L
PROT SERPL-MCNC: 7 G/DL
PROTEIN URINE: NEGATIVE MG/DL
RBC # BLD: 4.14 M/UL
RBC # FLD: 16.5 %
SODIUM SERPL-SCNC: 142 MMOL/L
SPECIFIC GRAVITY URINE: 1.02
T4 FREE SERPL-MCNC: 1 NG/DL
TRIGL SERPL-MCNC: 113 MG/DL
TSH SERPL-ACNC: 4.2 UIU/ML
UROBILINOGEN URINE: 0.2 MG/DL
VIT B12 SERPL-MCNC: 1289 PG/ML
WBC # FLD AUTO: 6.13 K/UL

## 2024-05-17 ENCOUNTER — APPOINTMENT (OUTPATIENT)
Dept: INTERNAL MEDICINE | Facility: CLINIC | Age: 69
End: 2024-05-17
Payer: MEDICARE

## 2024-05-17 ENCOUNTER — LABORATORY RESULT (OUTPATIENT)
Age: 69
End: 2024-05-17

## 2024-05-17 ENCOUNTER — NON-APPOINTMENT (OUTPATIENT)
Age: 69
End: 2024-05-17

## 2024-05-17 VITALS
WEIGHT: 178 LBS | DIASTOLIC BLOOD PRESSURE: 72 MMHG | BODY MASS INDEX: 30.39 KG/M2 | SYSTOLIC BLOOD PRESSURE: 126 MMHG | OXYGEN SATURATION: 97 % | HEIGHT: 64 IN | TEMPERATURE: 97.2 F | HEART RATE: 74 BPM

## 2024-05-17 DIAGNOSIS — Z86.39 PERSONAL HISTORY OF OTHER ENDOCRINE, NUTRITIONAL AND METABOLIC DISEASE: ICD-10-CM

## 2024-05-17 DIAGNOSIS — I10 ESSENTIAL (PRIMARY) HYPERTENSION: ICD-10-CM

## 2024-05-17 DIAGNOSIS — K42.9 UMBILICAL HERNIA W/OUT OBSTRUCTION OR GANGRENE: ICD-10-CM

## 2024-05-17 DIAGNOSIS — Z87.2 PERSONAL HISTORY OF DISEASES OF THE SKIN AND SUBCUTANEOUS TISSUE: ICD-10-CM

## 2024-05-17 DIAGNOSIS — L85.3 XEROSIS CUTIS: ICD-10-CM

## 2024-05-17 DIAGNOSIS — Z12.83 ENCOUNTER FOR SCREENING FOR MALIGNANT NEOPLASM OF SKIN: ICD-10-CM

## 2024-05-17 DIAGNOSIS — M17.12 UNILATERAL PRIMARY OSTEOARTHRITIS, LEFT KNEE: ICD-10-CM

## 2024-05-17 DIAGNOSIS — Z78.9 OTHER SPECIFIED HEALTH STATUS: ICD-10-CM

## 2024-05-17 DIAGNOSIS — E28.319 ASYMPTOMATIC PREMATURE MENOPAUSE: ICD-10-CM

## 2024-05-17 DIAGNOSIS — E66.3 OVERWEIGHT: ICD-10-CM

## 2024-05-17 DIAGNOSIS — Z87.898 PERSONAL HISTORY OF OTHER SPECIFIED CONDITIONS: ICD-10-CM

## 2024-05-17 DIAGNOSIS — Z00.00 ENCOUNTER FOR GENERAL ADULT MEDICAL EXAMINATION W/OUT ABNORMAL FINDINGS: ICD-10-CM

## 2024-05-17 DIAGNOSIS — N90.5 ATROPHY OF VULVA: ICD-10-CM

## 2024-05-17 DIAGNOSIS — S52.552D OTHER EXTRAARTICULAR FRACTURE OF LOWER END OF LEFT RADIUS, SUBSEQUENT ENCOUNTER FOR CLOSED FRACTURE WITH ROUTINE HEALING: ICD-10-CM

## 2024-05-17 DIAGNOSIS — Z78.0 ASYMPTOMATIC MENOPAUSAL STATE: ICD-10-CM

## 2024-05-17 PROCEDURE — 36415 COLL VENOUS BLD VENIPUNCTURE: CPT

## 2024-05-17 PROCEDURE — G0439: CPT

## 2024-05-17 PROCEDURE — 93000 ELECTROCARDIOGRAM COMPLETE: CPT

## 2024-05-17 RX ORDER — DICLOFENAC POTASSIUM 50 MG/1
50 TABLET, COATED ORAL 3 TIMES DAILY
Qty: 60 | Refills: 2 | Status: DISCONTINUED | COMMUNITY
Start: 2024-03-27 | End: 2024-05-17

## 2024-05-17 RX ORDER — MULTIVITAMIN
TABLET ORAL
Refills: 0 | Status: DISCONTINUED | COMMUNITY
End: 2024-05-17

## 2024-05-17 RX ORDER — AMLODIPINE AND VALSARTAN 5; 160 MG/1; MG/1
5-160 TABLET, FILM COATED ORAL
Qty: 90 | Refills: 1 | Status: ACTIVE | COMMUNITY
Start: 2021-03-05 | End: 1900-01-01

## 2024-05-17 RX ORDER — TOLTERODINE TARTRATE 4 MG/1
4 CAPSULE, EXTENDED RELEASE ORAL
Qty: 90 | Refills: 3 | Status: ACTIVE | COMMUNITY
Start: 2021-04-29 | End: 1900-01-01

## 2024-05-17 RX ORDER — DOXYCYCLINE HYCLATE 50 MG/1
CAPSULE ORAL
Refills: 0 | Status: DISCONTINUED | COMMUNITY
End: 2024-05-17

## 2024-05-17 NOTE — HEALTH RISK ASSESSMENT
[Good] : ~his/her~  mood as  good [No] : In the past 12 months have you used drugs other than those required for medical reasons? No [No falls in past year] : Patient reported no falls in the past year [de-identified] : active [de-identified] : regular [Former] : Former [> 15 Years] : > 15 Years [None] : None [] :  [Feels Safe at Home] : Feels safe at home [Fully functional (bathing, dressing, toileting, transferring, walking, feeding)] : Fully functional (bathing, dressing, toileting, transferring, walking, feeding) [Fully functional (using the telephone, shopping, preparing meals, housekeeping, doing laundry, using] : Fully functional and needs no help or supervision to perform IADLs (using the telephone, shopping, preparing meals, housekeeping, doing laundry, using transportation, managing medications and managing finances) [Reports changes in hearing] : Reports no changes in hearing [Reports changes in vision] : Reports no changes in vision [Reports changes in dental health] : Reports no changes in dental health [Smoke Detector] : smoke detector [Carbon Monoxide Detector] : carbon monoxide detector [Seat Belt] :  uses seat belt

## 2024-05-17 NOTE — ASSESSMENT
[FreeTextEntry1] : HCM Labs discussed with pt today repeat labs for mild anemia Gyn scheduled mammogram due later this year Bone density due Colonoscopy utd 2020 due 2025 Derm due continue current meds BP check in 6 months

## 2024-05-17 NOTE — HISTORY OF PRESENT ILLNESS
[FreeTextEntry1] : Annual Physical [de-identified] : COOPER WALDRON is a 69 yo woman with hypertension, GERD here for a physical. She has been well overall. Medical problems stable on current meds.  Gyn scheduled.  Mammogram, bone density due later this year.  Colonoscopy utd 2020 due 2025. Derm due. Will consider tdap, P 20 and shingrix  The patient is  with 2 children. She is a retired  from [employer].  Plans on restarting walking program.

## 2024-05-22 ENCOUNTER — TRANSCRIPTION ENCOUNTER (OUTPATIENT)
Age: 69
End: 2024-05-22

## 2024-05-22 LAB
ALBUMIN MFR SERPL ELPH: 57.5 %
ALBUMIN SERPL ELPH-MCNC: 4.6 G/DL
ALBUMIN SERPL-MCNC: 4.2 G/DL
ALBUMIN/GLOB SERPL: 1.4 RATIO
ALP BLD-CCNC: 93 U/L
ALPHA1 GLOB MFR SERPL ELPH: 4.9 %
ALPHA1 GLOB SERPL ELPH-MCNC: 0.4 G/DL
ALPHA2 GLOB MFR SERPL ELPH: 10.1 %
ALPHA2 GLOB SERPL ELPH-MCNC: 0.7 G/DL
ALT SERPL-CCNC: 14 U/L
ANION GAP SERPL CALC-SCNC: 12 MMOL/L
AST SERPL-CCNC: 20 U/L
B-GLOBULIN MFR SERPL ELPH: 15.5 %
B-GLOBULIN SERPL ELPH-MCNC: 1.1 G/DL
BASOPHILS # BLD AUTO: 0.05 K/UL
BASOPHILS NFR BLD AUTO: 0.8 %
BILIRUB SERPL-MCNC: 0.2 MG/DL
BUN SERPL-MCNC: 18 MG/DL
CALCIUM SERPL-MCNC: 9.7 MG/DL
CHLORIDE SERPL-SCNC: 101 MMOL/L
CO2 SERPL-SCNC: 26 MMOL/L
CREAT SERPL-MCNC: 0.76 MG/DL
EGFR: 85 ML/MIN/1.73M2
EOSINOPHIL # BLD AUTO: 0.12 K/UL
EOSINOPHIL NFR BLD AUTO: 2 %
FERRITIN SERPL-MCNC: 10 NG/ML
FOLATE SERPL-MCNC: >20 NG/ML
GAMMA GLOB FLD ELPH-MCNC: 0.9 G/DL
GAMMA GLOB MFR SERPL ELPH: 12 %
GLUCOSE SERPL-MCNC: 89 MG/DL
HCT VFR BLD CALC: 36.8 %
HGB BLD-MCNC: 11.2 G/DL
IMM GRANULOCYTES NFR BLD AUTO: 0.2 %
INTERPRETATION SERPL IEP-IMP: NORMAL
IRON SERPL-MCNC: 42 UG/DL
LYMPHOCYTES # BLD AUTO: 1.73 K/UL
LYMPHOCYTES NFR BLD AUTO: 28.2 %
MAN DIFF?: NORMAL
MCHC RBC-ENTMCNC: 26.9 PG
MCHC RBC-ENTMCNC: 30.4 GM/DL
MCV RBC AUTO: 88.5 FL
MONOCYTES # BLD AUTO: 0.37 K/UL
MONOCYTES NFR BLD AUTO: 6 %
NEUTROPHILS # BLD AUTO: 3.85 K/UL
NEUTROPHILS NFR BLD AUTO: 62.8 %
PLATELET # BLD AUTO: 291 K/UL
POTASSIUM SERPL-SCNC: 4.3 MMOL/L
PROT SERPL-MCNC: 7.2 G/DL
PROT SERPL-MCNC: 7.3 G/DL
PROT SERPL-MCNC: 7.3 G/DL
RBC # BLD: 4.16 M/UL
RBC # BLD: 4.16 M/UL
RBC # FLD: 16.3 %
RETICS # AUTO: 1.4 %
RETICS AGGREG/RBC NFR: 59.1 K/UL
SODIUM SERPL-SCNC: 139 MMOL/L
WBC # FLD AUTO: 6.13 K/UL

## 2024-06-04 ENCOUNTER — APPOINTMENT (OUTPATIENT)
Dept: INTERNAL MEDICINE | Facility: CLINIC | Age: 69
End: 2024-06-04
Payer: MEDICARE

## 2024-06-04 ENCOUNTER — APPOINTMENT (OUTPATIENT)
Dept: OBGYN | Facility: CLINIC | Age: 69
End: 2024-06-04

## 2024-06-04 ENCOUNTER — TRANSCRIPTION ENCOUNTER (OUTPATIENT)
Age: 69
End: 2024-06-04

## 2024-06-04 VITALS
HEART RATE: 90 BPM | TEMPERATURE: 98.7 F | WEIGHT: 178 LBS | SYSTOLIC BLOOD PRESSURE: 128 MMHG | BODY MASS INDEX: 30.39 KG/M2 | DIASTOLIC BLOOD PRESSURE: 80 MMHG | HEIGHT: 64 IN | OXYGEN SATURATION: 98 %

## 2024-06-04 DIAGNOSIS — D32.9 BENIGN NEOPLASM OF MENINGES, UNSPECIFIED: ICD-10-CM

## 2024-06-04 DIAGNOSIS — R42 DIZZINESS AND GIDDINESS: ICD-10-CM

## 2024-06-04 PROCEDURE — 99213 OFFICE O/P EST LOW 20 MIN: CPT

## 2024-06-04 NOTE — HISTORY OF PRESENT ILLNESS
[FreeTextEntry8] : 68F PMH HTN, GERD, urinary urgency, osteopenia presents for dizziness.  She reports that 2 nights ago she started feeling dizziness with head movement which self-resolved after a few hours. Today she said she woke up with dizziness and felt like the room was spinning with decreased appetite and nausea. She reports having a history of vertigo in which she saw an otolaryngologist in 2020 who ordered and MRI brain and internal auditory canals w/wo contrast which showed microvascular ischemic changes involving the parietal periventricular white matter bilaterally. Calcified dural based parasagittal lesion extra-axial in location consistent with a small right parasagittal meningioma. No other significant pathology. 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 He sent patient to neurology. Patient reports she has not seen neurology and that her symptoms improved with vestibular therapy. She denies any headaches, blurry vision, or recent illnesses. She did return from [place] 1.5 months ago.

## 2024-06-04 NOTE — REVIEW OF SYSTEMS
[Dizziness] : dizziness [Unsteady Walking] : ataxia [Negative] : Constitutional [Headache] : no headache [Fainting] : no fainting [Confusion] : no confusion [Memory Loss] : no memory loss

## 2024-06-04 NOTE — PLAN
[FreeTextEntry1] : 68F PMH HTN, GERD, urinary urgency, osteopenia presents for dizziness.  # vertigo - woke up with dizziness, worsening with head movement with decreased appetite and nausea - previous episode 2 nights ago with similar symptoms that self-resolved - BP well-controlled, no chest pain, shortness of breath, edema - history of vertigo in 2020 with eval with otolaryngologist and resolved with vestibular therapy - 2020 MRI brain and internal auditory canals w/wo contrast which showed microvascular ischemic changes involving the parietal periventricular white matter bilaterally. Calcified dural based parasagittal lesion extra-axial in location consistent with a small right parasagittal meningioma. No other significant pathology. - patient was referred to neuro but never went   - denies any headaches, blurry vision, or recent illnesses - on exam with CN 2-12 intact, strength and sensation intact bilaterally and symmetrically  - rx sent for meclizine 12.5mg q6hrs PRN - vestibular therapy and neurology referral sent

## 2024-06-06 RX ORDER — MECLIZINE HYDROCHLORIDE 25 MG/1
25 TABLET ORAL 3 TIMES DAILY
Qty: 15 | Refills: 0 | Status: ACTIVE | COMMUNITY
Start: 2024-06-04 | End: 1900-01-01

## 2024-06-14 NOTE — PHYSICAL EXAM
Pt VS stable. Pt resting in bed with eyes closed. Continuous observation still in place.    [No Acute Distress] : no acute distress [Well Nourished] : well nourished [Well Developed] : well developed [Normal Sclera/Conjunctiva] : normal sclera/conjunctiva [EOMI] : extraocular movements intact [Normal Outer Ear/Nose] : the outer ears and nose were normal in appearance [Supple] : supple [No Respiratory Distress] : no respiratory distress  [No Accessory Muscle Use] : no accessory muscle use [Clear to Auscultation] : lungs were clear to auscultation bilaterally [Normal Rate] : normal rate  [Regular Rhythm] : with a regular rhythm [Normal S1, S2] : normal S1 and S2 [No Murmur] : no murmur heard [Pedal Pulses Present] : the pedal pulses are present [No Edema] : there was no peripheral edema [No Extremity Clubbing/Cyanosis] : no extremity clubbing/cyanosis [Soft] : abdomen soft [Non Tender] : non-tender [Non-distended] : non-distended [Normal Bowel Sounds] : normal bowel sounds [Normal Posterior Cervical Nodes] : no posterior cervical lymphadenopathy [Normal Anterior Cervical Nodes] : no anterior cervical lymphadenopathy [No CVA Tenderness] : no CVA  tenderness [No Spinal Tenderness] : no spinal tenderness [No Joint Swelling] : no joint swelling [Grossly Normal Strength/Tone] : grossly normal strength/tone [No Rash] : no rash [No Focal Deficits] : no focal deficits [Normal Gait] : normal gait [Normal Affect] : the affect was normal [Normal Insight/Judgement] : insight and judgment were intact [Comprehensive Foot Exam Normal] : Right and left foot were examined and both feet are normal. No ulcers in either foot. Toes are normal and with full ROM.  Normal tactile sensation with monofilament testing throughout both feet [de-identified] : Mild carotid bruit.

## 2024-07-09 ENCOUNTER — TRANSCRIPTION ENCOUNTER (OUTPATIENT)
Age: 69
End: 2024-07-09

## 2024-07-30 ENCOUNTER — APPOINTMENT (OUTPATIENT)
Dept: GASTROENTEROLOGY | Facility: CLINIC | Age: 69
End: 2024-07-30
Payer: MEDICARE

## 2024-07-30 VITALS
SYSTOLIC BLOOD PRESSURE: 136 MMHG | HEIGHT: 64 IN | WEIGHT: 178 LBS | BODY MASS INDEX: 30.39 KG/M2 | DIASTOLIC BLOOD PRESSURE: 84 MMHG

## 2024-07-30 DIAGNOSIS — Z12.11 ENCOUNTER FOR SCREENING FOR MALIGNANT NEOPLASM OF COLON: ICD-10-CM

## 2024-07-30 DIAGNOSIS — K21.9 GASTRO-ESOPHAGEAL REFLUX DISEASE W/OUT ESOPHAGITIS: ICD-10-CM

## 2024-07-30 DIAGNOSIS — D50.0 IRON DEFICIENCY ANEMIA SECONDARY TO BLOOD LOSS (CHRONIC): ICD-10-CM

## 2024-07-30 PROCEDURE — 99203 OFFICE O/P NEW LOW 30 MIN: CPT

## 2024-07-30 NOTE — ASSESSMENT
[FreeTextEntry1] : 69yo female with low iron anemia, GERD  will check egd/colonoscopy with miralax prep Risks and benefits of procedure(s) discussed with patient in detail, including but not limited to, perforation, bleeding, reaction to anesthesia, missed lesions.  continue omeprazole for now

## 2024-07-30 NOTE — HISTORY OF PRESENT ILLNESS
[FreeTextEntry1] : 69yo female with low iron anemia  She has new incidental finding of low iron studies and new mild anemia She is largely asymptomatic  She has chronic GERD, maintained on omeprazole for years with occasional breakthrough with dietary indiscretion

## 2024-08-29 ENCOUNTER — APPOINTMENT (OUTPATIENT)
Dept: GASTROENTEROLOGY | Facility: AMBULATORY MEDICAL SERVICES | Age: 69
End: 2024-08-29
Payer: MEDICARE

## 2024-08-29 ENCOUNTER — NON-APPOINTMENT (OUTPATIENT)
Age: 69
End: 2024-08-29

## 2024-08-29 ENCOUNTER — RESULT REVIEW (OUTPATIENT)
Age: 69
End: 2024-08-29

## 2024-08-29 PROCEDURE — 43239 EGD BIOPSY SINGLE/MULTIPLE: CPT

## 2024-08-29 PROCEDURE — 45378 DIAGNOSTIC COLONOSCOPY: CPT

## 2024-08-30 ENCOUNTER — APPOINTMENT (OUTPATIENT)
Dept: RADIOLOGY | Facility: CLINIC | Age: 69
End: 2024-08-30
Payer: MEDICARE

## 2024-08-30 ENCOUNTER — APPOINTMENT (OUTPATIENT)
Dept: MAMMOGRAPHY | Facility: CLINIC | Age: 69
End: 2024-08-30
Payer: MEDICARE

## 2024-08-30 ENCOUNTER — RESULT REVIEW (OUTPATIENT)
Age: 69
End: 2024-08-30

## 2024-08-30 PROCEDURE — 77063 BREAST TOMOSYNTHESIS BI: CPT

## 2024-08-30 PROCEDURE — 77080 DXA BONE DENSITY AXIAL: CPT

## 2024-08-30 PROCEDURE — 77067 SCR MAMMO BI INCL CAD: CPT

## 2024-09-10 ENCOUNTER — APPOINTMENT (OUTPATIENT)
Dept: OBGYN | Facility: CLINIC | Age: 69
End: 2024-09-10
Payer: MEDICARE

## 2024-09-10 VITALS
WEIGHT: 176 LBS | HEIGHT: 64 IN | DIASTOLIC BLOOD PRESSURE: 83 MMHG | BODY MASS INDEX: 30.05 KG/M2 | SYSTOLIC BLOOD PRESSURE: 135 MMHG

## 2024-09-10 DIAGNOSIS — M85.80 OTHER SPECIFIED DISORDERS OF BONE DENSITY AND STRUCTURE, UNSPECIFIED SITE: ICD-10-CM

## 2024-09-10 DIAGNOSIS — N90.5 ATROPHY OF VULVA: ICD-10-CM

## 2024-09-10 DIAGNOSIS — Z01.411 ENCOUNTER FOR GYNECOLOGICAL EXAMINATION (GENERAL) (ROUTINE) WITH ABNORMAL FINDINGS: ICD-10-CM

## 2024-09-10 PROCEDURE — G0101: CPT | Mod: GA

## 2024-09-10 NOTE — HISTORY OF PRESENT ILLNESS
[No] : Patient does not have concerns regarding sex [Previously active] : previously active [FreeTextEntry1] : 09/10/2024. COOPER WALDRON 68 year old female  LMP @ 42 y/o. presents for annual visit. PMH atrophy, IBS, avascular necrosis of hip. SHx cholecystectomy, abdominal hernia   She denies daily discomfort 2/2 vaginal dryness, is not currently sexually active. She denies VB, abn discharge or vaginitis sxs. No urinary complaints. She has normal BM, no bloody stool. She denies abdominal or pelvic pain.   OBHx:  GynHx: No Hx of STI, fibroids, ovarian cysts, abnl paps, or pelvic infections. PMH: reflux, atrophy, avascular necrosis of hip, HTN, IBS SHx: cholecystectomy, right wrist surgery, abdominal hernia, cataract surgery. FHx: Denies FHx of breast, ovarian, uterine or colon cancer. Med: amlodipine, omeprazole, vit b12, vit d, latanoprost, tolterodine All: NKDA Social: rare alcohol use. former smoker. Psych: PHQ9 = 0 [Mammogramdate] : 8/2024 [TextBox_19] : BIRADS1. Repeat due 8/2025, Rx given.  [BoneDensityDate] : 8/2024 [TextBox_37] : Osteopenia w/ nml FRAX. Repeat due 8/2026. [ColonoscopyDate] : 8/2024 [TextBox_43] : Normal. Repeat due 8/2029.

## 2024-09-10 NOTE — PHYSICAL EXAM
[Chaperone Present] : A chaperone was present in the examining room during all aspects of the physical examination [[id number]] : A chaperone was present during the pelvic exam. [Appropriately responsive] : appropriately responsive [Alert] : alert [No Acute Distress] : no acute distress [No Lymphadenopathy] : no lymphadenopathy [Regular Rate Rhythm] : regular rate rhythm [No Murmurs] : no murmurs [Clear to Auscultation B/L] : clear to auscultation bilaterally [Soft] : soft [Non-tender] : non-tender [Non-distended] : non-distended [No HSM] : No HSM [No Lesions] : no lesions [No Mass] : no mass [Oriented x3] : oriented x3 [Examination Of The Breasts] : a normal appearance [No Masses] : no breast masses were palpable [Vulvar Atrophy] : vulvar atrophy [Labia Majora] : normal [Labia Minora] : normal [Atrophy] : atrophy [Normal] : normal [Uterine Adnexae] : normal [FreeTextEntry2] : Filippo Fitch [FreeTextEntry9] : No masses noted. Guaiac deferred due to recent colonoscopy

## 2024-09-10 NOTE — PLAN
[FreeTextEntry1] : 68 year old female presents for routine gyn exam - PMH atrophy, IBS, avascular necrosis of hip. SHx cholecystectomy, abdominal hernia, atrophy BSE taught Breast and pelvic exam performed Rectal exam performed - guaiac deferred due to recent colonoscopy Pap/HPV conducted  8/2024 mammogram. Rx given, due 8/2025 8/2024 colonoscopy, due 8/2029  Osteopenia: 8/2024 DEXA bone density. Rx given, due 8/2026 D/w pt increased calcium in diet & Vit D 1000 U intake, & weight-bearing exercise (i.e. walking) for 30 min daily   RTO in 1 year or PRN      I, Filippo Fitch am scribing for and in the presence of Dr. Busch in the following sections: HISTORY OF PRESENT ILLNESS, PAST MEDICAL/FAMILY/SOCIAL HISTORY, REVIEW OF SYSTEMS, PHYSICAL EXAM, ASSESSMENT/PLAN.

## 2024-09-12 LAB — HPV HIGH+LOW RISK DNA PNL CVX: NOT DETECTED

## 2024-09-16 DIAGNOSIS — K22.70 BARRETT'S ESOPHAGUS W/OUT DYSPLASIA: ICD-10-CM

## 2024-09-16 LAB — CYTOLOGY CVX/VAG DOC THIN PREP: ABNORMAL

## 2024-09-16 RX ORDER — PANTOPRAZOLE 40 MG/1
40 TABLET, DELAYED RELEASE ORAL DAILY
Qty: 1 | Refills: 3 | Status: ACTIVE | COMMUNITY
Start: 2024-09-16 | End: 1900-01-01

## 2024-09-26 ENCOUNTER — APPOINTMENT (OUTPATIENT)
Dept: GASTROENTEROLOGY | Facility: CLINIC | Age: 69
End: 2024-09-26

## 2024-09-26 PROCEDURE — 91110 GI TRC IMG INTRAL ESOPH-ILE: CPT

## 2024-09-26 RX ORDER — FAMOTIDINE 40 MG/1
40 TABLET, FILM COATED ORAL
Qty: 90 | Refills: 1 | Status: ACTIVE | COMMUNITY
Start: 2024-09-26 | End: 1900-01-01

## 2024-10-15 ENCOUNTER — TRANSCRIPTION ENCOUNTER (OUTPATIENT)
Age: 69
End: 2024-10-15

## 2024-10-22 ENCOUNTER — TRANSCRIPTION ENCOUNTER (OUTPATIENT)
Age: 69
End: 2024-10-22

## 2024-10-24 LAB
ALBUMIN SERPL ELPH-MCNC: 4.3 G/DL
ALP BLD-CCNC: 97 U/L
ALT SERPL-CCNC: 13 U/L
ANION GAP SERPL CALC-SCNC: 15 MMOL/L
AST SERPL-CCNC: 17 U/L
BASOPHILS # BLD AUTO: 0.06 K/UL
BASOPHILS NFR BLD AUTO: 1 %
BILIRUB SERPL-MCNC: 0.4 MG/DL
BUN SERPL-MCNC: 13 MG/DL
CALCIUM SERPL-MCNC: 10 MG/DL
CHLORIDE SERPL-SCNC: 104 MMOL/L
CO2 SERPL-SCNC: 24 MMOL/L
CREAT SERPL-MCNC: 0.86 MG/DL
EGFR: 73 ML/MIN/1.73M2
EOSINOPHIL # BLD AUTO: 0.12 K/UL
EOSINOPHIL NFR BLD AUTO: 2.1 %
FERRITIN SERPL-MCNC: 41 NG/ML
GLUCOSE SERPL-MCNC: 90 MG/DL
HCT VFR BLD CALC: 40.4 %
HGB BLD-MCNC: 12.3 G/DL
IMM GRANULOCYTES NFR BLD AUTO: 0.3 %
IRON SERPL-MCNC: 85 UG/DL
LYMPHOCYTES # BLD AUTO: 1.72 K/UL
LYMPHOCYTES NFR BLD AUTO: 30.1 %
MAN DIFF?: NORMAL
MCHC RBC-ENTMCNC: 29 PG
MCHC RBC-ENTMCNC: 30.4 GM/DL
MCV RBC AUTO: 95.3 FL
MONOCYTES # BLD AUTO: 0.44 K/UL
MONOCYTES NFR BLD AUTO: 7.7 %
NEUTROPHILS # BLD AUTO: 3.36 K/UL
NEUTROPHILS NFR BLD AUTO: 58.8 %
PLATELET # BLD AUTO: 225 K/UL
POTASSIUM SERPL-SCNC: 4.8 MMOL/L
PROT SERPL-MCNC: 6.8 G/DL
RBC # BLD: 4.24 M/UL
RBC # FLD: 15.3 %
SODIUM SERPL-SCNC: 143 MMOL/L
WBC # FLD AUTO: 5.72 K/UL

## 2024-10-28 ENCOUNTER — RX RENEWAL (OUTPATIENT)
Age: 69
End: 2024-10-28

## 2024-11-06 ENCOUNTER — TRANSCRIPTION ENCOUNTER (OUTPATIENT)
Age: 69
End: 2024-11-06

## 2024-11-14 RX ORDER — OMEPRAZOLE 40 MG/1
40 CAPSULE, DELAYED RELEASE ORAL
Qty: 30 | Refills: 1 | Status: ACTIVE | COMMUNITY
Start: 2024-11-14 | End: 1900-01-01

## 2024-11-18 ENCOUNTER — APPOINTMENT (OUTPATIENT)
Dept: INTERNAL MEDICINE | Facility: CLINIC | Age: 69
End: 2024-11-18
Payer: MEDICARE

## 2024-11-18 ENCOUNTER — APPOINTMENT (OUTPATIENT)
Dept: RADIOLOGY | Facility: CLINIC | Age: 69
End: 2024-11-18
Payer: MEDICARE

## 2024-11-18 VITALS
HEART RATE: 88 BPM | WEIGHT: 170 LBS | OXYGEN SATURATION: 97 % | DIASTOLIC BLOOD PRESSURE: 84 MMHG | SYSTOLIC BLOOD PRESSURE: 132 MMHG | TEMPERATURE: 98.7 F | BODY MASS INDEX: 29.02 KG/M2 | HEIGHT: 64 IN

## 2024-11-18 DIAGNOSIS — D32.9 BENIGN NEOPLASM OF MENINGES, UNSPECIFIED: ICD-10-CM

## 2024-11-18 DIAGNOSIS — I10 ESSENTIAL (PRIMARY) HYPERTENSION: ICD-10-CM

## 2024-11-18 DIAGNOSIS — K59.00 CONSTIPATION, UNSPECIFIED: ICD-10-CM

## 2024-11-18 DIAGNOSIS — R42 DIZZINESS AND GIDDINESS: ICD-10-CM

## 2024-11-18 DIAGNOSIS — K22.70 BARRETT'S ESOPHAGUS W/OUT DYSPLASIA: ICD-10-CM

## 2024-11-18 PROCEDURE — 99214 OFFICE O/P EST MOD 30 MIN: CPT

## 2024-11-18 PROCEDURE — G2211 COMPLEX E/M VISIT ADD ON: CPT

## 2024-11-18 PROCEDURE — 74019 RADEX ABDOMEN 2 VIEWS: CPT

## 2024-11-18 RX ORDER — MECLIZINE HYDROCHLORIDE 12.5 MG/1
12.5 TABLET ORAL
Qty: 20 | Refills: 0 | Status: ACTIVE | COMMUNITY
Start: 2024-11-18 | End: 1900-01-01

## 2024-11-20 ENCOUNTER — NON-APPOINTMENT (OUTPATIENT)
Age: 69
End: 2024-11-20

## 2024-11-21 ENCOUNTER — APPOINTMENT (OUTPATIENT)
Dept: MRI IMAGING | Facility: CLINIC | Age: 69
End: 2024-11-21
Payer: MEDICARE

## 2024-11-21 PROCEDURE — 70551 MRI BRAIN STEM W/O DYE: CPT

## 2024-12-05 ENCOUNTER — RX RENEWAL (OUTPATIENT)
Age: 69
End: 2024-12-05

## 2025-01-13 ENCOUNTER — RX RENEWAL (OUTPATIENT)
Age: 70
End: 2025-01-13

## 2025-03-07 ENCOUNTER — RX RENEWAL (OUTPATIENT)
Age: 70
End: 2025-03-07

## 2025-03-14 ENCOUNTER — RX RENEWAL (OUTPATIENT)
Age: 70
End: 2025-03-14

## 2025-05-09 ENCOUNTER — RX RENEWAL (OUTPATIENT)
Age: 70
End: 2025-05-09

## 2025-07-25 ENCOUNTER — RX RENEWAL (OUTPATIENT)
Age: 70
End: 2025-07-25

## 2025-07-31 ENCOUNTER — TRANSCRIPTION ENCOUNTER (OUTPATIENT)
Age: 70
End: 2025-07-31

## 2025-08-07 ENCOUNTER — LABORATORY RESULT (OUTPATIENT)
Age: 70
End: 2025-08-07

## 2025-08-08 LAB
25(OH)D3 SERPL-MCNC: 46.4 NG/ML
ALBUMIN SERPL ELPH-MCNC: 4.3 G/DL
ALP BLD-CCNC: 90 U/L
ALT SERPL-CCNC: 14 U/L
ANION GAP SERPL CALC-SCNC: 12 MMOL/L
APPEARANCE: CLEAR
AST SERPL-CCNC: 17 U/L
BASOPHILS # BLD AUTO: 0.05 K/UL
BASOPHILS NFR BLD AUTO: 0.9 %
BILIRUB SERPL-MCNC: 0.4 MG/DL
BILIRUBIN URINE: NEGATIVE
BLOOD URINE: NEGATIVE
BUN SERPL-MCNC: 15 MG/DL
CALCIUM SERPL-MCNC: 9.8 MG/DL
CHLORIDE SERPL-SCNC: 105 MMOL/L
CHOLEST SERPL-MCNC: 184 MG/DL
CO2 SERPL-SCNC: 26 MMOL/L
COLOR: YELLOW
CREAT SERPL-MCNC: 0.85 MG/DL
EGFRCR SERPLBLD CKD-EPI 2021: 74 ML/MIN/1.73M2
EOSINOPHIL # BLD AUTO: 0.1 K/UL
EOSINOPHIL NFR BLD AUTO: 1.9 %
ESTIMATED AVERAGE GLUCOSE: 108 MG/DL
GLUCOSE QUALITATIVE U: NEGATIVE MG/DL
GLUCOSE SERPL-MCNC: 95 MG/DL
HBA1C MFR BLD HPLC: 5.4 %
HCT VFR BLD CALC: 38.6 %
HDLC SERPL-MCNC: 69 MG/DL
HGB BLD-MCNC: 12 G/DL
IMM GRANULOCYTES NFR BLD AUTO: 0 %
KETONES URINE: NEGATIVE MG/DL
LDLC SERPL-MCNC: 98 MG/DL
LEUKOCYTE ESTERASE URINE: ABNORMAL
LYMPHOCYTES # BLD AUTO: 1.37 K/UL
LYMPHOCYTES NFR BLD AUTO: 25.6 %
MAN DIFF?: NORMAL
MCHC RBC-ENTMCNC: 30.2 PG
MCHC RBC-ENTMCNC: 31.1 G/DL
MCV RBC AUTO: 97.2 FL
MONOCYTES # BLD AUTO: 0.33 K/UL
MONOCYTES NFR BLD AUTO: 6.2 %
NEUTROPHILS # BLD AUTO: 3.51 K/UL
NEUTROPHILS NFR BLD AUTO: 65.4 %
NITRITE URINE: NEGATIVE
NONHDLC SERPL-MCNC: 114 MG/DL
PH URINE: 5.5
PLATELET # BLD AUTO: 205 K/UL
POTASSIUM SERPL-SCNC: 4.7 MMOL/L
PROT SERPL-MCNC: 6.8 G/DL
PROTEIN URINE: NEGATIVE MG/DL
RBC # BLD: 3.97 M/UL
RBC # FLD: 14.3 %
SODIUM SERPL-SCNC: 143 MMOL/L
SPECIFIC GRAVITY URINE: 1.02
T4 FREE SERPL-MCNC: 1 NG/DL
TRIGL SERPL-MCNC: 92 MG/DL
TSH SERPL-ACNC: 3.58 UIU/ML
UROBILINOGEN URINE: 0.2 MG/DL
VIT B12 SERPL-MCNC: 1039 PG/ML
WBC # FLD AUTO: 5.36 K/UL

## 2025-08-11 ENCOUNTER — NON-APPOINTMENT (OUTPATIENT)
Age: 70
End: 2025-08-11

## 2025-08-13 ENCOUNTER — NON-APPOINTMENT (OUTPATIENT)
Age: 70
End: 2025-08-13

## 2025-08-13 ENCOUNTER — APPOINTMENT (OUTPATIENT)
Dept: INTERNAL MEDICINE | Facility: CLINIC | Age: 70
End: 2025-08-13
Payer: MEDICARE

## 2025-08-13 VITALS
HEIGHT: 64 IN | BODY MASS INDEX: 27.83 KG/M2 | HEART RATE: 87 BPM | OXYGEN SATURATION: 98 % | WEIGHT: 163 LBS | SYSTOLIC BLOOD PRESSURE: 134 MMHG | TEMPERATURE: 99.1 F | DIASTOLIC BLOOD PRESSURE: 80 MMHG

## 2025-08-13 VITALS — DIASTOLIC BLOOD PRESSURE: 78 MMHG | SYSTOLIC BLOOD PRESSURE: 128 MMHG

## 2025-08-13 DIAGNOSIS — Z00.00 ENCOUNTER FOR GENERAL ADULT MEDICAL EXAMINATION W/OUT ABNORMAL FINDINGS: ICD-10-CM

## 2025-08-13 DIAGNOSIS — I10 ESSENTIAL (PRIMARY) HYPERTENSION: ICD-10-CM

## 2025-08-13 DIAGNOSIS — K22.70 BARRETT'S ESOPHAGUS W/OUT DYSPLASIA: ICD-10-CM

## 2025-08-13 PROCEDURE — 93000 ELECTROCARDIOGRAM COMPLETE: CPT

## 2025-08-13 PROCEDURE — G0439: CPT
